# Patient Record
Sex: MALE | Race: WHITE | NOT HISPANIC OR LATINO | Employment: OTHER | URBAN - METROPOLITAN AREA
[De-identification: names, ages, dates, MRNs, and addresses within clinical notes are randomized per-mention and may not be internally consistent; named-entity substitution may affect disease eponyms.]

---

## 2023-01-23 ENCOUNTER — HOSPITAL ENCOUNTER (INPATIENT)
Facility: CLINIC | Age: 83
LOS: 2 days | Discharge: HOME | DRG: 552 | End: 2023-01-25
Attending: EMERGENCY MEDICINE | Admitting: STUDENT IN AN ORGANIZED HEALTH CARE EDUCATION/TRAINING PROGRAM
Payer: COMMERCIAL

## 2023-01-23 ENCOUNTER — OFFICE VISIT (OUTPATIENT)
Dept: URGENT CARE | Facility: URGENT CARE | Age: 83
End: 2023-01-23
Payer: COMMERCIAL

## 2023-01-23 ENCOUNTER — APPOINTMENT (OUTPATIENT)
Dept: CT IMAGING | Facility: CLINIC | Age: 83
DRG: 552 | End: 2023-01-23
Attending: EMERGENCY MEDICINE
Payer: COMMERCIAL

## 2023-01-23 VITALS
WEIGHT: 158 LBS | DIASTOLIC BLOOD PRESSURE: 82 MMHG | TEMPERATURE: 97.2 F | OXYGEN SATURATION: 99 % | HEIGHT: 68 IN | BODY MASS INDEX: 23.95 KG/M2 | HEART RATE: 102 BPM | SYSTOLIC BLOOD PRESSURE: 128 MMHG

## 2023-01-23 DIAGNOSIS — R11.10 VOMITING, UNSPECIFIED VOMITING TYPE, UNSPECIFIED WHETHER NAUSEA PRESENT: ICD-10-CM

## 2023-01-23 DIAGNOSIS — S12.101A CLOSED NONDISPLACED FRACTURE OF SECOND CERVICAL VERTEBRA, UNSPECIFIED FRACTURE MORPHOLOGY, INITIAL ENCOUNTER (H): ICD-10-CM

## 2023-01-23 DIAGNOSIS — M54.2 NECK PAIN: Primary | ICD-10-CM

## 2023-01-23 DIAGNOSIS — S12.001A CLOSED NONDISPLACED FRACTURE OF FIRST CERVICAL VERTEBRA, UNSPECIFIED FRACTURE MORPHOLOGY, INITIAL ENCOUNTER (H): ICD-10-CM

## 2023-01-23 LAB
ABO/RH(D): NORMAL
ALBUMIN SERPL BCG-MCNC: 4.6 G/DL (ref 3.5–5.2)
ALP SERPL-CCNC: 51 U/L (ref 40–129)
ALT SERPL W P-5'-P-CCNC: 28 U/L (ref 10–50)
ANION GAP SERPL CALCULATED.3IONS-SCNC: 15 MMOL/L (ref 7–15)
ANTIBODY SCREEN: NEGATIVE
AST SERPL W P-5'-P-CCNC: 31 U/L (ref 10–50)
BASOPHILS # BLD AUTO: 0 10E3/UL (ref 0–0.2)
BASOPHILS NFR BLD AUTO: 0 %
BILIRUB SERPL-MCNC: 0.4 MG/DL
BUN SERPL-MCNC: 29.9 MG/DL (ref 8–23)
CALCIUM SERPL-MCNC: 9.1 MG/DL (ref 8.8–10.2)
CHLORIDE SERPL-SCNC: 102 MMOL/L (ref 98–107)
CREAT SERPL-MCNC: 0.86 MG/DL (ref 0.67–1.17)
DEPRECATED HCO3 PLAS-SCNC: 27 MMOL/L (ref 22–29)
EOSINOPHIL # BLD AUTO: 0 10E3/UL (ref 0–0.7)
EOSINOPHIL NFR BLD AUTO: 0 %
ERYTHROCYTE [DISTWIDTH] IN BLOOD BY AUTOMATED COUNT: 14.4 % (ref 10–15)
GFR SERPL CREATININE-BSD FRML MDRD: 87 ML/MIN/1.73M2
GLUCOSE SERPL-MCNC: 119 MG/DL (ref 70–99)
HCT VFR BLD AUTO: 44 % (ref 40–53)
HGB BLD-MCNC: 14.3 G/DL (ref 13.3–17.7)
IMM GRANULOCYTES # BLD: 0 10E3/UL
IMM GRANULOCYTES NFR BLD: 0 %
LYMPHOCYTES # BLD AUTO: 0.5 10E3/UL (ref 0.8–5.3)
LYMPHOCYTES NFR BLD AUTO: 6 %
MCH RBC QN AUTO: 27 PG (ref 26.5–33)
MCHC RBC AUTO-ENTMCNC: 32.5 G/DL (ref 31.5–36.5)
MCV RBC AUTO: 83 FL (ref 78–100)
MONOCYTES # BLD AUTO: 0.6 10E3/UL (ref 0–1.3)
MONOCYTES NFR BLD AUTO: 7 %
NEUTROPHILS # BLD AUTO: 7.3 10E3/UL (ref 1.6–8.3)
NEUTROPHILS NFR BLD AUTO: 87 %
NRBC # BLD AUTO: 0 10E3/UL
NRBC BLD AUTO-RTO: 0 /100
PLATELET # BLD AUTO: 266 10E3/UL (ref 150–450)
POTASSIUM SERPL-SCNC: 3.7 MMOL/L (ref 3.4–5.3)
PROT SERPL-MCNC: 7.4 G/DL (ref 6.4–8.3)
RBC # BLD AUTO: 5.29 10E6/UL (ref 4.4–5.9)
SARS-COV-2 RNA RESP QL NAA+PROBE: NEGATIVE
SODIUM SERPL-SCNC: 144 MMOL/L (ref 136–145)
SPECIMEN EXPIRATION DATE: NORMAL
WBC # BLD AUTO: 8.5 10E3/UL (ref 4–11)

## 2023-01-23 PROCEDURE — 120N000001 HC R&B MED SURG/OB

## 2023-01-23 PROCEDURE — 80053 COMPREHEN METABOLIC PANEL: CPT | Performed by: EMERGENCY MEDICINE

## 2023-01-23 PROCEDURE — 99222 1ST HOSP IP/OBS MODERATE 55: CPT | Mod: AI | Performed by: STUDENT IN AN ORGANIZED HEALTH CARE EDUCATION/TRAINING PROGRAM

## 2023-01-23 PROCEDURE — 250N000011 HC RX IP 250 OP 636: Performed by: EMERGENCY MEDICINE

## 2023-01-23 PROCEDURE — 99285 EMERGENCY DEPT VISIT HI MDM: CPT | Mod: 25

## 2023-01-23 PROCEDURE — U0005 INFEC AGEN DETEC AMPLI PROBE: HCPCS | Performed by: EMERGENCY MEDICINE

## 2023-01-23 PROCEDURE — 250N000013 HC RX MED GY IP 250 OP 250 PS 637: Performed by: STUDENT IN AN ORGANIZED HEALTH CARE EDUCATION/TRAINING PROGRAM

## 2023-01-23 PROCEDURE — 36415 COLL VENOUS BLD VENIPUNCTURE: CPT | Performed by: EMERGENCY MEDICINE

## 2023-01-23 PROCEDURE — 99207 PR INPT ADMISSION FROM CLINIC: CPT | Mod: 25 | Performed by: PHYSICIAN ASSISTANT

## 2023-01-23 PROCEDURE — C9803 HOPD COVID-19 SPEC COLLECT: HCPCS

## 2023-01-23 PROCEDURE — 72125 CT NECK SPINE W/O DYE: CPT

## 2023-01-23 PROCEDURE — 85025 COMPLETE CBC W/AUTO DIFF WBC: CPT | Performed by: EMERGENCY MEDICINE

## 2023-01-23 PROCEDURE — 86901 BLOOD TYPING SEROLOGIC RH(D): CPT | Performed by: EMERGENCY MEDICINE

## 2023-01-23 RX ORDER — SIMVASTATIN 40 MG
40 TABLET ORAL AT BEDTIME
COMMUNITY
Start: 2023-01-09

## 2023-01-23 RX ORDER — MULTIVITAMIN,THERAPEUTIC
1 TABLET ORAL DAILY
COMMUNITY

## 2023-01-23 RX ORDER — ACETAMINOPHEN 325 MG/1
650 TABLET ORAL EVERY 6 HOURS PRN
Status: DISCONTINUED | OUTPATIENT
Start: 2023-01-23 | End: 2023-01-25 | Stop reason: HOSPADM

## 2023-01-23 RX ORDER — AMOXICILLIN 250 MG
1 CAPSULE ORAL 2 TIMES DAILY PRN
Status: DISCONTINUED | OUTPATIENT
Start: 2023-01-23 | End: 2023-01-25 | Stop reason: HOSPADM

## 2023-01-23 RX ORDER — ONDANSETRON 4 MG/1
4 TABLET, ORALLY DISINTEGRATING ORAL EVERY 6 HOURS PRN
Status: DISCONTINUED | OUTPATIENT
Start: 2023-01-23 | End: 2023-01-25 | Stop reason: HOSPADM

## 2023-01-23 RX ORDER — PANTOPRAZOLE SODIUM 40 MG/1
40 TABLET, DELAYED RELEASE ORAL DAILY
Status: DISCONTINUED | OUTPATIENT
Start: 2023-01-24 | End: 2023-01-23

## 2023-01-23 RX ORDER — LIDOCAINE 40 MG/G
CREAM TOPICAL
Status: DISCONTINUED | OUTPATIENT
Start: 2023-01-23 | End: 2023-01-25 | Stop reason: HOSPADM

## 2023-01-23 RX ORDER — PROCHLORPERAZINE 25 MG
12.5 SUPPOSITORY, RECTAL RECTAL EVERY 12 HOURS PRN
Status: DISCONTINUED | OUTPATIENT
Start: 2023-01-23 | End: 2023-01-25 | Stop reason: HOSPADM

## 2023-01-23 RX ORDER — ACETAMINOPHEN 650 MG/1
650 SUPPOSITORY RECTAL EVERY 6 HOURS PRN
Status: DISCONTINUED | OUTPATIENT
Start: 2023-01-23 | End: 2023-01-25 | Stop reason: HOSPADM

## 2023-01-23 RX ORDER — PANTOPRAZOLE SODIUM 40 MG/1
40 TABLET, DELAYED RELEASE ORAL DAILY
Status: DISCONTINUED | OUTPATIENT
Start: 2023-01-24 | End: 2023-01-25 | Stop reason: HOSPADM

## 2023-01-23 RX ORDER — AMOXICILLIN 250 MG
2 CAPSULE ORAL 2 TIMES DAILY PRN
Status: DISCONTINUED | OUTPATIENT
Start: 2023-01-23 | End: 2023-01-25 | Stop reason: HOSPADM

## 2023-01-23 RX ORDER — MORPHINE SULFATE 4 MG/ML
4 INJECTION, SOLUTION INTRAMUSCULAR; INTRAVENOUS ONCE
Status: COMPLETED | OUTPATIENT
Start: 2023-01-23 | End: 2023-01-23

## 2023-01-23 RX ORDER — ONDANSETRON 2 MG/ML
4 INJECTION INTRAMUSCULAR; INTRAVENOUS EVERY 6 HOURS PRN
Status: DISCONTINUED | OUTPATIENT
Start: 2023-01-23 | End: 2023-01-25 | Stop reason: HOSPADM

## 2023-01-23 RX ORDER — PROCHLORPERAZINE MALEATE 5 MG
5 TABLET ORAL EVERY 6 HOURS PRN
Status: DISCONTINUED | OUTPATIENT
Start: 2023-01-23 | End: 2023-01-25 | Stop reason: HOSPADM

## 2023-01-23 RX ORDER — NAPROXEN 500 MG/1
500 TABLET ORAL 2 TIMES DAILY PRN
COMMUNITY
Start: 2023-01-09 | End: 2023-03-07

## 2023-01-23 RX ORDER — SIMVASTATIN 40 MG
40 TABLET ORAL AT BEDTIME
Status: DISCONTINUED | OUTPATIENT
Start: 2023-01-23 | End: 2023-01-25 | Stop reason: HOSPADM

## 2023-01-23 RX ADMIN — MORPHINE SULFATE 4 MG: 4 INJECTION, SOLUTION INTRAMUSCULAR; INTRAVENOUS at 22:27

## 2023-01-23 RX ADMIN — SIMVASTATIN 40 MG: 40 TABLET, FILM COATED ORAL at 22:25

## 2023-01-23 ASSESSMENT — ACTIVITIES OF DAILY LIVING (ADL)
ADLS_ACUITY_SCORE: 35

## 2023-01-23 ASSESSMENT — ENCOUNTER SYMPTOMS: NECK PAIN: 1

## 2023-01-23 NOTE — PROGRESS NOTES
Patient presents with complaints of hitting his head while kneeling onto an apparatus that was wrapped in a blanket.  States he had immediate pain in the neck.  He also states that he has vomited 3 times today - even water, and is very thirsty.    He has over 5 second tenting.  Patient sent to ED for IV fluids and evaluation and treatment.

## 2023-01-23 NOTE — ED TRIAGE NOTES
Fell forward from a kneeling position and struck his head last night. No LOC. Not on thinners. Today vomited x3. Has neck pain. Denies HA. Went to  and they though he is dehydrated, as well needs a CT scan. Marked decreased ROM in neck, cannot rotate, pain at base of skull     Triage Assessment       Row Name 01/23/23 0711       Triage Assessment (Adult)    Airway WDL WDL       Respiratory WDL    Respiratory WDL WDL       Skin Circulation/Temperature WDL    Skin Circulation/Temperature WDL WDL       Cardiac WDL    Cardiac WDL WDL       Peripheral/Neurovascular WDL    Peripheral Neurovascular WDL WDL       Cognitive/Neuro/Behavioral WDL    Cognitive/Neuro/Behavioral WDL WDL

## 2023-01-24 ENCOUNTER — APPOINTMENT (OUTPATIENT)
Dept: GENERAL RADIOLOGY | Facility: CLINIC | Age: 83
DRG: 552 | End: 2023-01-24
Attending: PHYSICIAN ASSISTANT
Payer: COMMERCIAL

## 2023-01-24 PROCEDURE — 120N000001 HC R&B MED SURG/OB

## 2023-01-24 PROCEDURE — 250N000013 HC RX MED GY IP 250 OP 250 PS 637: Performed by: EMERGENCY MEDICINE

## 2023-01-24 PROCEDURE — 99232 SBSQ HOSP IP/OBS MODERATE 35: CPT | Performed by: HOSPITALIST

## 2023-01-24 PROCEDURE — 250N000013 HC RX MED GY IP 250 OP 250 PS 637: Performed by: STUDENT IN AN ORGANIZED HEALTH CARE EDUCATION/TRAINING PROGRAM

## 2023-01-24 PROCEDURE — 250N000013 HC RX MED GY IP 250 OP 250 PS 637: Performed by: HOSPITALIST

## 2023-01-24 PROCEDURE — L0172 CERV COL SR FOAM 2PC PRE OTS: HCPCS

## 2023-01-24 PROCEDURE — 99222 1ST HOSP IP/OBS MODERATE 55: CPT | Performed by: PHYSICIAN ASSISTANT

## 2023-01-24 PROCEDURE — 72040 X-RAY EXAM NECK SPINE 2-3 VW: CPT

## 2023-01-24 PROCEDURE — L1499 SPINAL ORTHOSIS NOS: HCPCS

## 2023-01-24 RX ORDER — NALOXONE HYDROCHLORIDE 0.4 MG/ML
0.4 INJECTION, SOLUTION INTRAMUSCULAR; INTRAVENOUS; SUBCUTANEOUS
Status: DISCONTINUED | OUTPATIENT
Start: 2023-01-24 | End: 2023-01-25 | Stop reason: HOSPADM

## 2023-01-24 RX ORDER — CYCLOBENZAPRINE HCL 5 MG
5-10 TABLET ORAL EVERY 8 HOURS PRN
Status: DISCONTINUED | OUTPATIENT
Start: 2023-01-24 | End: 2023-01-25 | Stop reason: HOSPADM

## 2023-01-24 RX ORDER — OXYCODONE HYDROCHLORIDE 5 MG/1
5-10 TABLET ORAL EVERY 4 HOURS PRN
Status: DISCONTINUED | OUTPATIENT
Start: 2023-01-24 | End: 2023-01-25 | Stop reason: HOSPADM

## 2023-01-24 RX ORDER — NALOXONE HYDROCHLORIDE 0.4 MG/ML
0.2 INJECTION, SOLUTION INTRAMUSCULAR; INTRAVENOUS; SUBCUTANEOUS
Status: DISCONTINUED | OUTPATIENT
Start: 2023-01-24 | End: 2023-01-25 | Stop reason: HOSPADM

## 2023-01-24 RX ORDER — ACETAMINOPHEN 500 MG
1000 TABLET ORAL EVERY 8 HOURS
Status: DISCONTINUED | OUTPATIENT
Start: 2023-01-24 | End: 2023-01-25 | Stop reason: HOSPADM

## 2023-01-24 RX ADMIN — ACETAMINOPHEN 1000 MG: 500 TABLET ORAL at 18:03

## 2023-01-24 RX ADMIN — ACETAMINOPHEN 650 MG: 325 TABLET, FILM COATED ORAL at 10:46

## 2023-01-24 RX ADMIN — ACETAMINOPHEN 650 MG: 325 TABLET, FILM COATED ORAL at 01:58

## 2023-01-24 RX ADMIN — OXYCODONE HYDROCHLORIDE 10 MG: 5 TABLET ORAL at 18:03

## 2023-01-24 RX ADMIN — OXYCODONE HYDROCHLORIDE 5 MG: 5 TABLET ORAL at 08:57

## 2023-01-24 RX ADMIN — PANTOPRAZOLE SODIUM 40 MG: 40 TABLET, DELAYED RELEASE ORAL at 08:57

## 2023-01-24 RX ADMIN — OXYCODONE HYDROCHLORIDE 5 MG: 5 TABLET ORAL at 01:58

## 2023-01-24 RX ADMIN — OXYCODONE HYDROCHLORIDE 10 MG: 5 TABLET ORAL at 23:08

## 2023-01-24 RX ADMIN — OXYCODONE HYDROCHLORIDE 10 MG: 5 TABLET ORAL at 13:11

## 2023-01-24 RX ADMIN — SIMVASTATIN 40 MG: 40 TABLET, FILM COATED ORAL at 21:12

## 2023-01-24 ASSESSMENT — ACTIVITIES OF DAILY LIVING (ADL)
ADLS_ACUITY_SCORE: 35
ADLS_ACUITY_SCORE: 40
ADLS_ACUITY_SCORE: 36
ADLS_ACUITY_SCORE: 35

## 2023-01-24 NOTE — PROGRESS NOTES
Status [id number]    Pt is AX4 and is call light appropriate. Pt is up with SBA to bathroom. C-collar on at all times, pt verbalized that he has chronic numbness in feet but denies having any new numbness or tingling in body, pulses present. Pt given prn medication for pain, please see MAR for further details. NPO midnight. Fall risk precautions in place, call light within reach. Will continue to monitor and treat as needed.

## 2023-01-24 NOTE — PROGRESS NOTES
[facility]    Hospitalist Progress Note    Date of Admission:  1/23/2023    Assessment & Plan   Jaden Zimmer is a 82 year old male with past medical history significant for HLD admitted on 1/23/2023 with neck pain.      Acute type II odontoid fracture   Acute nondisplaced bilateral lateral fractures of the C1 arch   Mechanical fall   The patient presents to the ED with neck pain. He states that he was on his knees putting something away in the closet the previous night and lost his balance when he leaned forward and fell and hit his head on the wall.  On day of admission, he has been having significant neck pain and is unable to turn his head.   * CT showed Acute type II odontoid fracture with fracture fragment alignment maintained. Acute nondisplaced bilateral lateral fractures of the C1 arch. There is mild prevertebral soft tissue edema at the C1-2 level but predental space is maintained. No significant canal compromise or neural foraminal narrowing throughout cervical spine.  - Neurosurgery consulted.  No surgical plans.  Recommended Aspen cervical collar but should be worn at all times, okay to remove briefly for hygiene, shower collar will be provided for bathing.  -Upright x-rays AP and lateral to be obtained after collar placed.  -Will need to follow-up with neurosurgery clinic in 6 weeks with x-rays AP and lateral and at 12 weeks with repeat cervical CT scan without contrast.  - Pain control as needed: Flexeril, Tylenol, oxycodone  -Activity as tolerated.  Avoid excessive bending and twisting of his neck and do not lift heavy objects.  - PT/OT     Hyperlipidemia  - Resume PTA simvastatin      Diet: Regular   DVT Prophylaxis: Pneumatic Compression Devices  Grimes Catheter: Not present  Lines: None     Cardiac Monitoring: None  Code Status: Full Code               Medical Decision Making               Clinically Significant Risk Factors Present on Admission                                  Jessica Navarro MD  Text Page (7am - 6pm, M-F)  [facility]  Securely message with the [employer] Web Console (learn more here)  Text page via [employer] Paging/Directory      Interval History   Stable overnight.  Patient notes that he is not doing good, mainly because of significant pain.  The cough is severely limiting his activity.  He is worried about how he will take care of his wife at home.  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    -Data reviewed today: I reviewed all new labs and imaging results over the last 24 hours. I personally reviewed CT.  Images resulted as above.    Physical Exam   Temp: 97.5  F (36.4  C) Temp src: Oral BP: 120/74 Pulse: 70   Resp: 16 SpO2: 97 % O2 Device: None (Room air)    Vitals:    01/23/23 1706   Weight: 71.7 kg (158 lb)     Vital Signs with Ranges  Temp:  [97.5  F (36.4  C)-98.2  F (36.8  C)] 97.5  F (36.4  C)  Pulse:  [70-94] 70  Resp:  [16] 16  BP: (120-139)/(65-75) 120/74  SpO2:  [94 %-100 %] 97 %  I/O last 3 completed shifts:  In: 500 [P.O.:500]  Out: -     Constitutional: Alert, appears comfortable, in no acute distress, wearing Aspen collar  Respiratory: Non labored breathing, clear to auscultation bilaterally, no crackles or wheezes  Cardiovascular: Heart sounds regular rate and rhythm, no murmurs, no leg edema  GI: Abdomen is soft, non distended, non tender. Normal BS  Skin/Integumen: no rashes, no pressure sores  Neuro: alert, converses appropriately, moving all extremities, fluent speech, no facial asymmetry  Psych: mood and affect appropriate      Medications       pantoprazole  40 mg Oral Daily     simvastatin  40 mg Oral At Bedtime     sodium chloride (PF)  3 mL Intracatheter Q8H       Data   Recent Labs   Lab 01/23/23  1851   WBC 8.5   HGB 14.3   MCV 83         POTASSIUM 3.7   CHLORIDE 102   CO2 27   BUN 29.9*   CR 0.86   ANIONGAP 15   NARENDRA 9.1   *   ALBUMIN 4.6   PROTTOTAL 7.4   BILITOTAL  0.4   ALKPHOS 51   ALT 28   AST 31       Imaging  Recent Results (from the past 24 hour(s))   Cervical spine CT w/o contrast    Narrative    EXAM: CT CERVICAL SPINE W/O CONTRAST  LOCATION: [facility]  DATE/TIME: 1/23/2023 6:14 PM    INDICATION: Neck pain after a fall.  COMPARISON: None.  TECHNIQUE: CT cervical spine without contrast. Dose reduction techniques were performed.    FINDINGS: There is mild prevertebral soft tissue swelling at the level of the C1 and C2 vertebral bodies. The predental space is maintained. There is a nondisplaced acute type II odontoid fracture with no significant fracture displacement or angulation.   There are bilateral nondisplaced fractures of the lateral portions of the C1 arch bilaterally. This is best visualized on axial bone window image #32. 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 No other acute cervical spine fracture is   visualized. There is a slight anterior listhesis of C4 in relation to C5. The vertebral body heights are well-maintained throughout. There is diffuse degenerative disc disease throughout the cervical spine with a mild loss of height, endplate changes   along with small anterior and posterior osteophyte formations. There is probable ankylosis across the C5-6 disc space level. There is diffuse facet arthropathy visualized.    There is no significant canal compromise or significant neural foraminal narrowing throughout the cervical spine. The lung apices are clear. The paraspinal soft tissues are unremarkable.      Impression    IMPRESSION:  1. Acute type II odontoid fracture with fracture fragment alignment maintained.  2. Acute nondisplaced bilateral lateral fractures of the C1 arch.  3. Mild prevertebral soft tissue edema at the C1-2 level but predental space is maintained.  4.  No significant canal compromise or neural foraminal narrowing throughout cervical spine.    These findings were communicated by phone to   Joing at 6:35 pm on 1/23/2023.   XR Cervical Spine 2/3 Views    Narrative    CERVICAL SPINE TWO TO THREE VIEWS  1/24/2023 1:58 PM    INDICATION: C2 and C1 fracture.    COMPARISON: Cervical spine CT 1/23/2023.      Impression    IMPRESSION: Fractures through the base of the odontoid process and  posterior C1 ring are visible radiographically, grossly unchanged from  the prior CT allowing for differences in modality. Alignment within  normal limits, unchanged. Mild degenerative changes.    ZOE NANCE MD         SYSTEM ID:  [id number]

## 2023-01-24 NOTE — PHARMACY-ADMISSION MEDICATION HISTORY
Pharmacy Medication History  Admission medication history interview status for the 1/23/2023  admission is complete. See EPIC admission navigator for prior to admission medications     Location of Interview: Patient room  Medication history sources: Patient, Surescripts and Care Everywhere    Significant changes made to the medication list:  1. Added multivitamin  2. Added frequency to all other meds    In the past week, patient estimated taking medication this percent of the time: greater than 90%    Medication Affordability:  Not including over the counter (OTC) medications, was there a time in the past 12 months when you did not take your medications as prescribed because of cost?: No    Additional medication history information:   1. Patient reported he has been taking 1 tablet of naproxen every morning to prevent pain.    Medication reconciliation completed by provider prior to medication history? No    Time spent in this activity: 15 min    Prior to Admission medications    Medication Sig Last Dose Taking? Auth Provider Long Term End Date   multivitamin, therapeutic (THERA-VIT) TABS tablet Take 1 tablet by mouth daily 1/23/2023 at AM Yes Unknown, Entered By History     naproxen (NAPROSYN) 500 MG tablet Take 500 mg by mouth 2 times daily as needed (pain) 1/23/2023 at AM Yes Reported, Patient     omeprazole (PRILOSEC) 20 MG DR capsule Take 20 mg by mouth daily 1/23/2023 at AM Yes Reported, Patient     simvastatin (ZOCOR) 40 MG tablet Take 40 mg by mouth At Bedtime 1/22/2023 at HS Yes Reported, Patient Yes        The information provided in this note is only as accurate as the sources available at the time of update(s)

## 2023-01-24 NOTE — ED NOTES
"[facility]  ED Nurse Handoff Report    ED Chief complaint: Fall, Neck Pain, and Head Injury      ED Diagnosis:   Final diagnoses:   None       Code Status: hospitalist to determine    Allergies: No Known Allergies    Patient Story:   Pt was cleaning in a closet yesterday.  Was on his knees and lost his balance falling  Forward.     Focused Assessment:    Pt Alert and orient x4. Does c/o neck pain. Neck Brace intact.     Labs Ordered and Resulted from Time of ED Arrival to Time of ED Departure - No data to display     Cervical spine CT w/o contrast   Final Result   IMPRESSION:   1. Acute type II odontoid fracture with fracture fragment alignment maintained.   2. Acute nondisplaced bilateral lateral fractures of the C1 arch.   3. Mild prevertebral soft tissue edema at the C1-2 level but predental space is maintained.   4.  No significant canal compromise or neural foraminal narrowing throughout cervical spine.      These findings were communicated by phone to Dr. Lama at 6:35 pm on 1/23/2023.            Treatments and/or interventions provided:      Medications - No data to display     Patient's response to treatments and/or interventions:   Resting comfortably    To be done/followed up on inpatient unit:    See any in-patient orders    Does this patient have any cognitive concerns?: none    Activity level - Baseline/Home:    Independent    Activity Level - Current:     Stand with Assist    Patient's Preferred language: English     Needed?: No    Isolation: None  Infection: Not Applicable  Patient tested for COVID 19 prior to admission: NO    Bariatric?: No    Vital Signs:   Vitals:    01/23/23 1706   BP: 125/65   Pulse: 94   Resp: 16   Temp: 98.1  F (36.7  C)   TempSrc: Temporal   SpO2: 100%   Weight: 71.7 kg (158 lb)   Height: 1.727 m (5' 8\")       Cardiac Rhythm:     Was the PSS-3 completed:   Yes  What interventions are required if any?               Family Comments: Wife at home. "   OBS brochure/video discussed/provided to patient/family: N/A              Name of person given brochure if not patient:              Relationship to patient:    For the majority of the shift this patient's behavior was Green.   Behavioral interventions performed were     ED NURSE PHONE NUMBER: *[phone]

## 2023-01-24 NOTE — PROGRESS NOTES
RECEIVING UNIT ED HANDOFF REVIEW    ED Nurse Handoff Report was reviewed by: Robin Sainz RN on January 24, 2023 at 1:20 AM

## 2023-01-24 NOTE — PROGRESS NOTES
S: Patient was seen today at 2412 for fitting of Aspen cervical collar and Marcia collar as ordered.    O:  Pt 1st fit with a Marcia collar.  Posterior Marcia collar was donned.  Anterior Marcia collar was donned and locked into slot # 2.  The Marcia collar fits adequate.   I removed the Marcia collar.     Pt was fit with a [place] cervical collar. The anterior section was donned. The posterior section was donned and fastened to the front using the velcro straps. Attention was given to the chin support being sure that the correct height was set to support the chin. An extra padding set was also provided. Patient instructed on donning, doffing, use and care.  Pt is not happy about either collars at this time stating he does not know how he is going to be able to deal with wearing these.      A: An [place] collar and Marcia collar for bathing was provided and fit as required.     P: Patient will wear the Aspen collar at all times following Physician guidelines except for when bathing.  When bathing the patient will wear the Marcia collar. Patient has been instructed to contact our facility with any questions and/or concerns    G: The objective/goal of the collar is to help reduce motion/give cervical stability.  Ke TOLEDO

## 2023-01-24 NOTE — PLAN OF CARE
Summary: C1. C2 fracture   Date & Time: 1/24/23 6254-8154  Orientation: A&x4  Activity Level: SBA  Fall Risk: Yes  Behavior & Aggression: Green  Pain Management: Decreased with oxy & tylenol    ABNL VS/O2: VSS on RA  ABNL Lab/BG: WNL  Diet: Regular  Bowel/Bladder: Continent   Drains/Devices: Aspen C-collar in place (on all times, ex can remove for quick hygiene, taken off for showers),   Skin/peripheral: chronic numbness in feet (baseline), pulses present  Tests/Procedures: upright x-ray after aspirin collar  Anticipated  DC Date: Pending  Other Important Info: pt will not need surgery

## 2023-01-24 NOTE — H&P
[facility]    History and Physical - Hospitalist Service       Date of Admission:  1/23/2023    Assessment & Plan      Jaden Zimmer is a 82 year old male with past medical history significant for HLD admitted on 1/23/2023 with neck pain.     Acute type II odontoid fracture   Acute nondisplaced bilateral lateral fractures of the C1 arch   Mechanical fall   The patient presents to the ED with neck pain. He states that he was on his knees putting something away in the closet last night and lost his balance when he leaned forward and fell and hit his head on the wall. Today he has been having significant neck pain and is unable to turn his head.   * CT showed Acute type II odontoid fracture with fracture fragment alignment maintained. Acute nondisplaced bilateral lateral fractures of the C1 arch. There is mild prevertebral soft tissue edema at the C1-2 level but predental space is maintained. No significant canal compromise or neural foraminal narrowing throughout cervical spine.  - Neurosurgery consulted  - C collar at all times   - Pain control as needed   - NPO at midnight in case intervention is needed   - PT/OT when cleared by neurosurgery     Hyperlipidemia  - Resume PTA simvastatin     Diet: Regular   DVT Prophylaxis: Pneumatic Compression Devices  Grimes Catheter: Not present  Lines: None     Cardiac Monitoring: None  Code Status: Full Code       Aleena Crandall MD  Hospitalist Service  [facility]  Securely message with [employer] (more info)  Text page via [employer] Paging/Directory     ______________________________________________________________________    Chief Complaint   Neck pain     History is obtained from the patient    History of Present Illness   Jaden Zimmer is a 82 year old male who presents to the ED with neck pain. He states that he was on his knees putting something away in the closet last night and lost his balance when he leaned  forward and fell and hit his head on the wall. He denies any preceding symptoms such as light-headedness, chest pain, palpitations, shortness of breath etc. He denies LOC. Today he has been having significant neck pain and is unable to turn his head.      Past Medical History    No past medical history on file.    Past Surgical History   No past surgical history on file.    Prior to Admission Medications   Prior to Admission Medications   Prescriptions Last Dose Informant Patient Reported? Taking?   multivitamin, therapeutic (THERA-VIT) TABS tablet 1/23/2023 at AM Self Yes Yes   Sig: Take 1 tablet by mouth daily   naproxen (NAPROSYN) 500 MG tablet 1/23/2023 at AM Self Yes Yes   Sig: Take 500 mg by mouth 2 times daily as needed (pain)   omeprazole (PRILOSEC) 20 MG DR capsule 1/23/2023 at AM Self Yes Yes   Sig: Take 20 mg by mouth daily   simvastatin (ZOCOR) 40 MG tablet 1/22/2023 at HS Self Yes Yes   Sig: Take 40 mg by mouth At Bedtime      Facility-Administered Medications: None        Review of Systems    The 10 point Review of Systems is negative other than noted in the HPI or here.   Physical Exam   Vital Signs: Temp: 98.1  F (36.7  C) Temp src: Temporal BP: 125/65 Pulse: 94   Resp: 16 SpO2: 100 % O2 Device: None (Room air)    Weight: 158 lbs 0 oz    Constitutional: Awake, alert, cooperative, no apparent distress.  Eyes: Conjunctiva and pupils examined and normal.  HEENT: Moist mucous membranes, normal dentition.  Respiratory: Clear to auscultation bilaterally, no crackles or wheezing.  Cardiovascular: Regular rate and rhythm, normal S1 and S2, and no murmur noted.  GI: Soft, non-distended, non-tender, normal bowel sounds.  Lymph/Hematologic: No anterior cervical or supraclavicular adenopathy.  Skin: No rashes, no cyanosis, no edema.  Musculoskeletal: No joint swelling, erythema or tenderness. In C collar   Neurologic: Cranial nerves 2-12 intact, normal strength and sensation.  Psychiatric: Alert, oriented to  person, place and time, no obvious anxiety or depression.      Medical Decision Making       60 MINUTES SPENT BY ME on the date of service doing chart review, history, exam, documentation & further activities per the note.      Data     I have personally reviewed the following data over the past 24 hrs:    8.5  \   14.3   / 266     144 102 29.9 (H) /  119 (H)   3.7 27 0.86 \       ALT: 28 AST: 31 AP: 51 TBILI: 0.4   ALB: 4.6 TOT PROTEIN: 7.4 LIPASE: N/A       Imaging results reviewed over the past 24 hrs:   Recent Results (from the past 24 hour(s))   Cervical spine CT w/o contrast    Narrative    EXAM: CT CERVICAL SPINE W/O CONTRAST  LOCATION: [facility]  DATE/TIME: 1/23/2023 6:14 PM    INDICATION: Neck pain after a fall.  COMPARISON: None.  TECHNIQUE: CT cervical spine without contrast. Dose reduction techniques were performed.    FINDINGS: There is mild prevertebral soft tissue swelling at the level of the C1 and C2 vertebral bodies. The predental space is maintained. There is a nondisplaced acute type II odontoid fracture with no significant fracture displacement or angulation.   There are bilateral nondisplaced fractures of the lateral portions of the C1 arch bilaterally. This is best visualized on axial bone window image #32. 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 No other acute cervical spine fracture is   visualized. There is a slight anterior listhesis of C4 in relation to C5. The vertebral body heights are well-maintained throughout. There is diffuse degenerative disc disease throughout the cervical spine with a mild loss of height, endplate changes   along with small anterior and posterior osteophyte formations. There is probable ankylosis across the C5-6 disc space level. There is diffuse facet arthropathy visualized.    There is no significant canal compromise or significant neural foraminal narrowing throughout the cervical spine. The lung apices are clear.  The paraspinal soft tissues are unremarkable.      Impression    IMPRESSION:  1. Acute type II odontoid fracture with fracture fragment alignment maintained.  2. Acute nondisplaced bilateral lateral fractures of the C1 arch.  3. Mild prevertebral soft tissue edema at the C1-2 level but predental space is maintained.  4.  No significant canal compromise or neural foraminal narrowing throughout cervical spine.    These findings were communicated by phone to Dr. Lama at 6:35 pm on 1/23/2023.

## 2023-01-24 NOTE — ED PROVIDER NOTES
"  History     Chief Complaint:  Fall, Neck Pain, and Head Injury       HPI   Jaden Zimmer is a 82 year old male with a history of hyperlipidemia who presents with neck pain. The patient was putting something away in his closet last night, and was on his hands and knees. He leaned forward, lost balance, and fell, hitting his forehead on the wall. He is having neck pain today and is unable to turn his head.     Review of External Notes:   Reviewed outside facility urgent care note:  1/23/2023  [facility]   Berkley Mccarty  Internal Medicine  Neck pain +1 more  Dx  Urgent Care    Vomiting   Reason for Visit     Progress Notes  Berkley Mccarty (Physician Assistant - C)     Internal Medicine  Patient presents with complaints of hitting his head while kneeling onto an apparatus that was wrapped in a blanket.  States he had immediate pain in the neck.  He also states that he has vomited 3 times today - even water, and is very thirsty.     He has over 5 second tenting.  Patient sent to ED for IV fluids and evaluation and treatment.              ROS:  Review of Systems   Musculoskeletal: Positive for neck pain.   All other systems reviewed and are negative.      Allergies:  No known drug allergies     Medications:    Omeprazole  Simvastatin  Naproxen    Past Medical History:    Hyperlipidemia  Adenoma of the large intestine  Sensorineural hearing loss    Past Surgical History:    Tonsillectomy  Bilateral cataract removal    Family History:    Father- MI  Mother- breast cancer, cataracts    Social History:  The patient presents via private vehicle    Physical Exam     Patient Vitals for the past 24 hrs:   BP Temp Temp src Pulse Resp SpO2 Height Weight   01/23/23 1706 125/65 98.1  F (36.7  C) Temporal 94 16 100 % 1.727 m (5' 8\") 71.7 kg (158 lb)        Physical Exam  Exam:   General: Alert, No distress. Nontoxic appearance. Sitting up.   Head: No signs of trauma. "   Mouth/Throat: Oropharynx moist.   Eyes: Conjunctivae are normal. Pupils are equal..   Neck: Difficult to rotating head at all. Pain to posterior upper cervical spine.   CV: Appears well perfused.  Resp:No respiratory distress.   MSK: Normal range of motion. No obvious deformity.   Neuro: The patient is alert and interactive. SANTOS. Speech normal. GCS 15  Skin: No lesion or sign of trauma noted.   Psych: normal mood and affect. behavior is normal.     Emergency Department Course     Imaging:  Cervical spine CT w/o contrast   Final Result   IMPRESSION:   1. Acute type II odontoid fracture with fracture fragment alignment maintained.   2. Acute nondisplaced bilateral lateral fractures of the C1 arch.   3. Mild prevertebral soft tissue edema at the C1-2 level but predental space is maintained.   4.  No significant canal compromise or neural foraminal narrowing throughout cervical spine.      These findings were communicated by phone to Dr. Lama at 6:35 pm on 1/23/2023.         Report per radiology    Laboratory:  Labs Ordered and Resulted from Time of ED Arrival to Time of ED Departure   CBC WITH PLATELETS AND DIFFERENTIAL - Abnormal       Result Value    WBC Count 8.5      RBC Count 5.29      Hemoglobin 14.3      Hematocrit 44.0      MCV 83      MCH 27.0      MCHC 32.5      RDW 14.4      Platelet Count 266      % Neutrophils 87      % Lymphocytes 6      % Monocytes 7      % Eosinophils 0      % Basophils 0      % Immature Granulocytes 0      NRBCs per 100 WBC 0      Absolute Neutrophils 7.3      Absolute Lymphocytes 0.5 (*)     Absolute Monocytes 0.6      Absolute Eosinophils 0.0      Absolute Basophils 0.0      Absolute Immature Granulocytes 0.0      Absolute NRBCs 0.0     COMPREHENSIVE METABOLIC PANEL        Emergency Department Course & Assessments:     Interventions:  Medications - No data to display     Independent Interpretation (X-rays, CTs, rhythm strip):  CT scan shows evidence of cervical spine  fracture    Consultations/Discussion of Management or Tests:  1832 I spoke with radiology regarding the patient's imaging results. There is a type II odontoid fracture and bilateral lateral fractures of the C1 arch. Neurosurgery paged.    1840  I spoke with bryon Almaraz PA-C working with Dr. Cruz from neurosurgery.  1919 I spoke with Dr. Crandall from hospitalist service regarding the patient's presentation and workup.     Assessments:  1710 I obtained history and examined the patient as noted above.  1715 Patient placed in C-collar.   1835 I rechecked the patient and discussed his imaging results. Will plan for admission.     Disposition:  The patient was admitted to the hospital under the care of Dr. Crandall.     Impression & Plan      Medical Decision Making:  This patient is presenting to the ER after a minor fall that resulted in a cervical spine fracture.  He was seen in the triage area initially and immediately placed in a c-collar.  He is neurologically intact.  With normal strength and sensation in his upper and lower extremities.  Neurosurgery was consulted and will give recommendations for further treatment.  I spoke with Dr. Crandall to admit the patient to the hospitalist service.    Diagnosis:    ICD-10-CM    1. Closed nondisplaced fracture of first cervical vertebra, unspecified fracture morphology, initial encounter (H)  S12.001A              Scribe Disclosure:  I, Myriam Ortiz, am serving as a scribe at 6:41 PM on 1/23/2023 to document services personally performed by Delroy Lama MD based on my observations and the provider's statements to me.     1/23/2023   Delroy Lama MD Joing, Todd Roger, MD  01/24/23 1507

## 2023-01-24 NOTE — CONSULTS
Neurosurgery Consult    HPI    Mr. Zimmer is an 82-year-old male, who fell forward hitting his forehead yesterday, he presented to the emergency department with neck pain, he was found to have acute type II odontoid fracture, and acute nondisplaced bilateral lateral fractures of the C1 arch.  He reports neck pain.  He denies any other symptoms.  He denies numbness or tingling in his upper or lower extremities.  Denies any weakness in his upper or lower extremities.  He was placed in a cervical collar in the ER and admitted for observation.    Medical history  Hyperlipidemia    Social history  Retired      B/P: 120/74, T: 97.5, P: 70, R: 16       Exam    Alert and oriented no acute distress  Bilateral upper extremities with 5/5 strength  Trudi sign negative  Reflexes absent triceps, absent biceps, 1+ brachioradialis    Bilateral lower extremities with 5/5 strength  Reflexes absent patella/ankle  Negative ankle clonus negative Babinski bilaterally    Cervical spine mildly tender to palpation in the midline and at the base of the skull.    Imaging    Cervical CT scan demonstrated    IMPRESSION:  1. Acute type II odontoid fracture with fracture fragment alignment maintained.  2. Acute nondisplaced bilateral lateral fractures of the C1 arch.  3. Mild prevertebral soft tissue edema at the C1-2 level but predental space is maintained.  4.  No significant canal compromise or neural foraminal narrowing throughout cervical spine.    Assessment    Status post fall, neck pain, acute type II odontoid fracture, acute nondisplaced bilateral lateral fractures of the C1 arch, no canal compromise, no radicular or myelopathic symptoms.    Plan:      Recommend the patient obtain a Aspen cervical collar, consult orthotics has been placed, he should wear the collar at all times, okay to remove briefly for hygiene, shower collar will be provided for bathing.    Once his aspirin collar has been placed he should have upright x-rays AP  and lateral.    We will plan to follow-up with him in 6 weeks with x-rays AP and lateral, and at 12 weeks with a repeat cervical CT scan without contrast.    He may have a regular diet and activity as tolerated.  He should avoid excessive bending and twisting of his neck, and not lift any heavy objects.    Discussed with Dr. Cruz

## 2023-01-24 NOTE — PROVIDER NOTIFICATION
MD Notification    Notified Person: MD    Notified Person Name: Radha Lancaster    Notification Date/Time: 1/24/23    Notification Interaction: [employer] web paged    Purpose of Notification: activity orders and NPO status    Orders Received:    Comments:

## 2023-01-25 ENCOUNTER — APPOINTMENT (OUTPATIENT)
Dept: PHYSICAL THERAPY | Facility: CLINIC | Age: 83
DRG: 552 | End: 2023-01-25
Attending: HOSPITALIST
Payer: COMMERCIAL

## 2023-01-25 ENCOUNTER — APPOINTMENT (OUTPATIENT)
Dept: OCCUPATIONAL THERAPY | Facility: CLINIC | Age: 83
DRG: 552 | End: 2023-01-25
Attending: HOSPITALIST
Payer: COMMERCIAL

## 2023-01-25 VITALS
SYSTOLIC BLOOD PRESSURE: 135 MMHG | TEMPERATURE: 97.5 F | HEIGHT: 68 IN | BODY MASS INDEX: 23.95 KG/M2 | RESPIRATION RATE: 18 BRPM | OXYGEN SATURATION: 98 % | HEART RATE: 74 BPM | DIASTOLIC BLOOD PRESSURE: 81 MMHG | WEIGHT: 158 LBS

## 2023-01-25 DIAGNOSIS — S12.101A CLOSED NONDISPLACED FRACTURE OF SECOND CERVICAL VERTEBRA, UNSPECIFIED FRACTURE MORPHOLOGY, INITIAL ENCOUNTER (H): Primary | ICD-10-CM

## 2023-01-25 DIAGNOSIS — S12.001A CLOSED NONDISPLACED FRACTURE OF FIRST CERVICAL VERTEBRA, UNSPECIFIED FRACTURE MORPHOLOGY, INITIAL ENCOUNTER (H): ICD-10-CM

## 2023-01-25 PROCEDURE — 250N000013 HC RX MED GY IP 250 OP 250 PS 637: Performed by: EMERGENCY MEDICINE

## 2023-01-25 PROCEDURE — 99239 HOSP IP/OBS DSCHRG MGMT >30: CPT | Performed by: HOSPITALIST

## 2023-01-25 PROCEDURE — 97165 OT EVAL LOW COMPLEX 30 MIN: CPT | Mod: GO

## 2023-01-25 PROCEDURE — 250N000013 HC RX MED GY IP 250 OP 250 PS 637: Performed by: HOSPITALIST

## 2023-01-25 PROCEDURE — 97116 GAIT TRAINING THERAPY: CPT | Mod: GP | Performed by: PHYSICAL THERAPIST

## 2023-01-25 PROCEDURE — 97530 THERAPEUTIC ACTIVITIES: CPT | Mod: GP | Performed by: PHYSICAL THERAPIST

## 2023-01-25 PROCEDURE — 250N000013 HC RX MED GY IP 250 OP 250 PS 637: Performed by: STUDENT IN AN ORGANIZED HEALTH CARE EDUCATION/TRAINING PROGRAM

## 2023-01-25 PROCEDURE — 250N000011 HC RX IP 250 OP 636: Performed by: STUDENT IN AN ORGANIZED HEALTH CARE EDUCATION/TRAINING PROGRAM

## 2023-01-25 PROCEDURE — 97535 SELF CARE MNGMENT TRAINING: CPT | Mod: GO

## 2023-01-25 PROCEDURE — 97161 PT EVAL LOW COMPLEX 20 MIN: CPT | Mod: GP | Performed by: PHYSICAL THERAPIST

## 2023-01-25 RX ORDER — AMOXICILLIN 250 MG
1 CAPSULE ORAL 2 TIMES DAILY
Qty: 30 TABLET | Refills: 0 | Status: SHIPPED | OUTPATIENT
Start: 2023-01-25 | End: 2023-03-07

## 2023-01-25 RX ORDER — OXYCODONE HYDROCHLORIDE 5 MG/1
5-10 TABLET ORAL EVERY 6 HOURS PRN
Qty: 45 TABLET | Refills: 0 | Status: SHIPPED | OUTPATIENT
Start: 2023-01-25 | End: 2023-03-07

## 2023-01-25 RX ORDER — ACETAMINOPHEN 500 MG
TABLET ORAL
Qty: 100 TABLET | Refills: 0 | Status: SHIPPED | OUTPATIENT
Start: 2023-01-25 | End: 2023-03-07

## 2023-01-25 RX ORDER — POLYETHYLENE GLYCOL 3350 17 G/17G
1 POWDER, FOR SOLUTION ORAL DAILY PRN
Qty: 10 PACKET | Refills: 0 | Status: SHIPPED | OUTPATIENT
Start: 2023-01-25 | End: 2023-03-07

## 2023-01-25 RX ORDER — METHOCARBAMOL 500 MG/1
500 TABLET, FILM COATED ORAL 4 TIMES DAILY PRN
Qty: 30 TABLET | Refills: 0 | Status: SHIPPED | OUTPATIENT
Start: 2023-01-25 | End: 2023-03-07

## 2023-01-25 RX ADMIN — OXYCODONE HYDROCHLORIDE 10 MG: 5 TABLET ORAL at 09:13

## 2023-01-25 RX ADMIN — PANTOPRAZOLE SODIUM 40 MG: 40 TABLET, DELAYED RELEASE ORAL at 07:44

## 2023-01-25 RX ADMIN — ACETAMINOPHEN 1000 MG: 500 TABLET ORAL at 01:10

## 2023-01-25 RX ADMIN — ACETAMINOPHEN 650 MG: 325 TABLET, FILM COATED ORAL at 14:36

## 2023-01-25 RX ADMIN — ACETAMINOPHEN 1000 MG: 500 TABLET ORAL at 07:43

## 2023-01-25 RX ADMIN — ONDANSETRON 4 MG: 4 TABLET, ORALLY DISINTEGRATING ORAL at 03:52

## 2023-01-25 RX ADMIN — CYCLOBENZAPRINE 10 MG: 5 TABLET, FILM COATED ORAL at 14:36

## 2023-01-25 RX ADMIN — PROCHLORPERAZINE MALEATE 5 MG: 5 TABLET ORAL at 07:43

## 2023-01-25 RX ADMIN — OXYCODONE HYDROCHLORIDE 10 MG: 5 TABLET ORAL at 03:43

## 2023-01-25 ASSESSMENT — ACTIVITIES OF DAILY LIVING (ADL)
ADLS_ACUITY_SCORE: 40

## 2023-01-25 NOTE — PROGRESS NOTES
[facility]  Neurosurgery Daily Progress Note    Assessment & Plan   82M admitted s/p fall with neck pain and acute type II odontoid fracture and acute nondisplaced bilateral lateral fractures of the C1 arch. Today, patient reports 5/10 neck pain, denies radicular symptoms. Cervical collar in place. Upright xray stable, reviewed with Dr. Cruz.     CERVICAL SPINE TWO TO THREE VIEWS  1/24/2023 1:58 PM  IMPRESSION: Fractures through the base of the odontoid process and  posterior C1 ring are visible radiographically, grossly unchanged from  the prior CT allowing for differences in modality. Alignment within  normal limits, unchanged. Mild degenerative changes.     Plan:  - Continue to wear Aspen collar at all times, okay to remove briefly for hygiene, Marcia collar fitted for showering  - Avoid excessive bending, twisting, and heavy lifting   - Follow up with NSG clinic in 6 weeks with XR and 12 weeks with CT   - NSG will sign off, please call with questions or concerns   - Appreciate assistance from specialties        Discussed with Dr. Anthony Rodriguez, CNP  [facility] [place] [address]  Tel [phone]  Pager [phone]    Interval History   Stable     Physical Exam   Temp: 97.5  F (36.4  C) Temp src: Oral BP: 135/81 Pulse: 74   Resp: 18 SpO2: 98 % O2 Device: None (Room air)    Vitals:    01/23/23 1706   Weight: 71.7 kg (158 lb)     Vital Signs with Ranges  Temp:  [97.5  F (36.4  C)] 97.5  F (36.4  C)  Pulse:  [69-74] 74  Resp:  [16-18] 18  BP: (135-140)/(79-81) 135/81  SpO2:  [97 %-98 %] 98 %  I/O last 3 completed shifts:  In: 800 [P.O.:800]  Out: -     Mental status:  Alert and oriented x 3, speech is fluent.  Cranial nerves:  II-XII intact.   Motor:  Moves all extremities. Strength is 5/5 throughout the upper and lower extremities.   Sensation:  Intact  Cervical collar in place    Medications         acetaminophen  1,000 mg Oral Q8H     pantoprazole  40 mg Oral Daily     simvastatin  40 mg Oral At Bedtime     sodium chloride (PF)  3 mL Intracatheter Q8H       [facility] Neurosurgery   [address] [place] [address]  Tel [phone]  Pager [phone]

## 2023-01-25 NOTE — PROGRESS NOTES
01/25/23 1039   Appointment Info   Signing Clinician's Name / Credentials (PT) Payal Alvarado PT   Living Environment   People in Home spouse   Current Living Arrangements apartment   Home Accessibility no concerns   Transportation Anticipated family or friend will provide   Living Environment Comments pt normally drives, will be unable to do so for a few months, Family can assist and apt building also has a service that drives them to nearby grocery stores, etc   Self-Care   Usual Activity Tolerance excellent   Current Activity Tolerance moderate   Regular Exercise No   Fall history within last six months yes   Number of times patient has fallen within last six months 1   Activity/Exercise/Self-Care Comment pt normally is IND with mobility and does most of the ADLs at home, no AD at baseline   General Information   Onset of Illness/Injury or Date of Surgery 01/22/23   Referring Physician Dr Navarro   Patient/Family Therapy Goals Statement (PT) Pt hopes to go home today   Pertinent History of Current Problem (include personal factors and/or comorbidities that impact the POC) Pt admitted after falling into the wall while on his knees putting shoes away in the closet. Had pain, went to urgent care the next day and ended up at the hospital. Type II odontoid fracture and nondisplaced ernesto fracture of C1 arch. PMH includes HLD.   Existing Precautions/Restrictions   (Aspen brace at all times, no bending/lifting/twisting)   Cognition   Affect/Mental Status (Cognition) WNL   Orientation Status (Cognition) oriented x 3   Follows Commands (Cognition) WNL   Cognitive Status Comments appears cognitively intact   Pain Assessment   Patient Currently in Pain Yes, see Vital Sign flowsheet   Integumentary/Edema   Integumentary/Edema no deficits were identifed   Posture    Posture Comments pt has ASPEN collar on, forward head posture   Range of Motion (ROM)   ROM Comment ROM WFL   Strength (Manual Muscle Testing)   Strength (Manual  Muscle Testing) Deficits observed during functional mobility   Strength Comments at least partially due to pain with movement   Bed Mobility   Comment, (Bed Mobility) pt too painful to work on bed mobility with bed flat, plans to sleep in chair/recliner at home   Transfers   Comment, (Transfers) SBA sit to/from stand from bed   Gait/Stairs (Locomotion)   Distance in Feet (Gait) 200' and 100'   Comment, (Gait/Stairs) Gait 50' without AD and SBA   Balance   Balance Comments mildly decreased balance d/t pain and inability to turn head/look around, also has mild neuropathy ernesto feet.   Sensory Examination   Sensory Perception Comments neuropathy ernesto   Coordination   Coordination no deficits were identified   Muscle Tone   Muscle Tone no deficits were identified   Clinical Impression   Criteria for Skilled Therapeutic Intervention Yes, treatment indicated   PT Diagnosis (PT) impaired functional mobility   Influenced by the following impairments decreased strength, balance, pain   Functional limitations due to impairments decreased IND with mobility   Clinical Presentation (PT Evaluation Complexity) Stable/Uncomplicated   Clinical Presentation Rationale per medical record   Clinical Decision Making (Complexity) low complexity   Planned Therapy Interventions (PT) gait training;transfer training   Risk & Benefits of therapy have been explained evaluation/treatment results reviewed   PT Total Evaluation Time   PT Eval, Low Complexity Minutes (97757) 15   Physical Therapy Goals   PT Frequency One time eval and treatment only   PT Predicted Duration/Target Date for Goal Attainment 01/26/23   PT Goals Transfers;Gait   PT: Transfers Modified independent;Bed to/from chair;Sit to/from stand   PT: Gait Independent;100 feet  (no AD)   Interventions   Interventions Quick Adds Gait Training;Therapeutic Activity   Therapeutic Activity   Therapeutic Activities: dynamic activities to improve functional performance Minutes (68517) 10    Symptoms Noted During/After Treatment Fatigue;Increased pain   Treatment Detail/Skilled Intervention PT - pt with HOB elevated able to perform bed mobility INDly, some pain with movement, rates 4/10. Sit to stand from bed and toilet with SBA initially, progressing to IND. Able to don pants INDly, stood at sink to wash hands after using toilet with supervision. Pt plans to sleep in recliner at home, discussed pressure relief options (pt reports his tailbone/buttocks get sore in chair for extended periods) including getting up and walking short distances frequently, shifting weight, and pressure relief cushions. Looked some up on [employer] and reviewed some options with patient, he reports his kids will order one for him.   Gait Training   Gait Training Minutes (09029) 15   Symptoms Noted During/After Treatment (Gait Training) increased pain   Treatment Detail/Skilled Intervention PT - gt training without AD and CGA initially, progressing to SBA/mod IND at end. Pt with guarded gait pattern, encouraged him to turn shoulders to be able to look around as he can't turn head/neck. Overall moving well.   [place] Level (Gait Training) independent   PT Discharge Planning   PT Plan Home later today with home PT/OT   PT Discharge Recommendation (DC Rec) home with home care physical therapy;home with assist   PT Rationale for DC Rec Pt is normally very IND at home, doesn't use AD, just moved into  apt 6 weeks ago. There are some services there but it's brand new and it's just getting started. Pt drives and does most chores around the house as spouse has cervical dystonia and walks with a walker. Son and dtr have been staying with wife. Currently pt is moving fairly well, transfers with SBA and amb 300' without AD. Recommend return home with home PT/OT and assist from family with some chores and driving/grocery shopping, etc.   PT Brief overview of current status SBA transfers and gait 300' without AD.   Total Session Time    Timed Code Treatment Minutes 25   Total Session Time (sum of timed and untimed services) 40

## 2023-01-25 NOTE — PLAN OF CARE
Goal Outcome Evaluation:    Pt is alert and oriented x 4, VSS on RA, regular diet.  Pt needs to keep cervical collar on at all times. Pt also has another for showering placed in his room. Pain was managed with schedule tylenol and oxycodone. Pt also andrew of N/V, zofran given x 1.

## 2023-01-25 NOTE — CONSULTS
Initial Consult/  Care Management Discharge Note    Discharge Date: 01/25/2023       Discharge Disposition:  Home    Discharge Services:  Home Care-Referral sent and accepted- [facility] (PT/OT)    Discharge DME:   fitting of Aspen cervical collar and Marcia collar as ordered-Done by Orthotist 1/24    Discharge Transportation: Daughter Dari will plan to pick patient up when bedside RN Pablo calls her. ([phone])    Private pay costs discussed: Not applicable    PAS Confirmation Code:  n/a  Patient/family educated on Medicare website which has current facility and service quality ratings:  yes    Education Provided on the Discharge Plan: yes   Persons Notified of Discharge Plans: Patient, bedside RN, charge, [facility]  Patient/Family in Agreement with the Plan:  yes    Handoff Referral Completed: Yes-Outpatient Care Coordination Referral    Additional Information:   Patient admitted on 1/23/23 after falling at home.The patient was putting something away in his closet and was on his hands and knees. He leaned forward, lost balance, and fell, hitting his forehead on the wall. He presented with neck pain and inability to turn his head. Imaging in the ED showed Acute type II odontoid fracture and nondisplaced bilateral lateral fractures of the C1 arch. Patient was fitted for Marcia and [place] Collars by Orthotist on 1/24. Met with patient, introduced self and role as Care Coordinator. Writer has been informed that patient has been medically cleared to discharge home today. Patient lives with his spouse who he is the primary caregiver of. Patient has 2 adult children (Dari and Terry) who have been taking care of patient's spouse while he has been in the hospital. Writer spoke with patient and his daughter Dari. Dari and her brother Terry along with patient's 3 sister in-laws will be rotating taking care of patient and his spouse. Dari confirmed everything is  in place for patient to discharge home today. Patient and his spouse live in the independent section of an long term. ([facility] in [facility] The only service they have is dining room meals. Dari will be transporting patient home today. Bedside RN Pablo will plan to call patient's daughter Dari when discharge is ready. ([phone]). Home Care has been ordered for PT/OT. [facility] has accepted the referral for start of care 1/28. Patient and family in agreement with this plan. Patient has both of his collars for discharge. No further discharge needs.          Adriana Disla, RN  Care Coordinator  [phone]

## 2023-01-25 NOTE — PROGRESS NOTES
A/O x4. VSS on RA. PIV SL. SBA to BR/RM. Wears cervical collar at all time except during hygiene. Shower collar provided and in the RM. On regular diet. Denies N/T in all extremities. Pain managed with prn Oxy x2 and schedule Tylenol and effective.

## 2023-01-25 NOTE — PLAN OF CARE
Patient A/Ox4, VSS,RA. CMS intact, pain manage w/ oxy, Celebrex and tylenol.   Patient educated regarding pain and medication management, and follow ups.   Discharge at 1530 to home. Picked up by kortney valdivia.

## 2023-01-25 NOTE — PROGRESS NOTES
01/25/23 1330   Appointment Info   Signing Clinician's Name / Credentials (OT) Soheila Rocha, OTR/L   Living Environment   People in Home spouse   Current Living Arrangements apartment   Home Accessibility no concerns   Transportation Anticipated family or friend will provide  (apartment building also has transportation services for groceries, etc.)   Living Environment Comments Pt lives in apartment building w/ spouse. Pt has walk in shower w/ grab bars. Pt also has children who live nearby and can assist as needed   Self-Care   Usual Activity Tolerance excellent   Current Activity Tolerance moderate   Regular Exercise No   Fall history within last six months yes   Number of times patient has fallen within last six months 1   Activity/Exercise/Self-Care Comment Pt independent w/ ADL tasks at home, ambulates w/out an AD   Instrumental Activities of Daily Living (IADL)   IADL Comments Pt manages laundry, cooking, cleaning but reports family can help as needed. he does drive and manages his own meds   General Information   Onset of Illness/Injury or Date of Surgery 01/23/23   Referring Physician Jessica Navarro MD   Patient/Family Therapy Goal Statement (OT) go home   Additional Occupational Profile Info/Pertinent History of Current Problem Pt is an 81 y/o male who was admitted s/p fall with neck pain and acute type II odontoid fracture and acute nondisplaced bilateral lateral fractures of the C1 arch.   Existing Precautions/Restrictions fall;spinal  (cervical collar at all times)   Cognitive Status Examination   Orientation Status orientation to person, place and time   Affect/Mental Status (Cognitive) WNL   Visual Perception   Visual Impairment/Limitations WFL;corrective lenses full-time   Pain Assessment   Patient Currently in Pain Yes, see Vital Sign flowsheet   Range of Motion Comprehensive   Comment, General Range of Motion WFL   Strength Comprehensive (MMT)   Comment, General Manual Muscle Testing (MMT)  Assessment did not formally assess strength d/t spinal precautions, but appears WFL for ADL tasks   Transfers   Transfers toilet transfer;sit-stand transfer   Sit-Stand Transfer   Sit-Stand [place] (Transfers) modified independence   Toilet Transfer   Type (Toilet Transfer) stand-sit;sit-stand   [place] Level (Toilet Transfer) modified independence   Balance   Balance Comments pt steady while walking in baez w/out AD   Activities of Daily Living   BADL Assessment/Intervention upper body dressing;lower body dressing;toileting;grooming   Upper Body Dressing Assessment/Training   [place] Level (Upper Body Dressing) minimum assist (75% patient effort);verbal cues   Lower Body Dressing Assessment/Training   [place] Level (Lower Body Dressing) supervision;verbal cues   Grooming Assessment/Training   [place] Level (Grooming) independent   Toileting   [place] Level (Toileting) independent   Clinical Impression   Criteria for Skilled Therapeutic Interventions Met (OT) Yes, treatment indicated   OT Diagnosis decreased I/ADL independence   OT Problem List-Impairments impacting ADL problems related to;activity tolerance impaired;pain;post-surgical precautions   Assessment of Occupational Performance 3-5 Performance Deficits   Identified Performance Deficits decreased independence w/ dressing, bathing, home mgmt, driving   Planned Therapy Interventions (OT) ADL retraining;transfer training;risk factor education   Clinical Decision Making Complexity (OT) low complexity   Anticipated Equipment Needs Upon Discharge (OT)   (pt has what he needs)   Risk & Benefits of therapy have been explained evaluation/treatment results reviewed;care plan/treatment goals reviewed;risks/benefits reviewed;current/potential barriers reviewed;participants voiced agreement with care plan;participants included;patient   OT Total Evaluation Time   OT Eval, Low Complexity Minutes (67124) 10   OT Goals   Therapy Frequency (OT)  One time eval and treatment   OT Predicted Duration/Target Date for Goal Attainment 01/25/23   OT Goals Upper Body Dressing;Lower Body Dressing;Toilet Transfer/Toileting;Transfers   OT: Upper Body Dressing Minimal assist;within precautions;Goal Met   OT: Lower Body Dressing Modified independent;within precautions;Goal Met   OT: Transfer Modified independent;within precautions;Goal Met   OT: Toilet Transfer/Toileting Modified independent;within precautions;Goal Met   Interventions   Interventions Quick Adds Self-Care/Home Management   Self-Care/Home Management   Self-Care/Home Mgmt/ADL, Compensatory, Meal Prep Minutes (68464) 15   Symptoms Noted During/After Treatment (Meal Preparation/Planning Training) increased pain   Treatment Detail/Skilled Intervention Pt in chair upon arrival, agrees to OT. Plan to d/c home later today w/ family assist. Practiced don/doff of c-collar and pt able to complete w/ SBA. He stood w/ SBA and walked into bathroom w/out AD - steady throughout. Pt SBA for toilet transfer and voided. Independent w/ clothing mgmt and timothy cares. Pt then stood at sink to wash hands independently. Pt walked back into room, SBA to don pants, socks, and shoes - VC for techniqiue w/ spinal precautions. Pt then donned sweatshirt, needing Min A from writer as well as VC. Pt does have increased pain. Discussed dressing techniques and potentially using button/zip up shirts for ease. Pt agreeable. He then stood and ambulated in hallway ~300ft w/ SBA. Pt tolerated well w/ no LOB. Returned to room, pt sat in chair. Discussed shower plan - pt has grab bars and reports family can be nearby on 1st attempt for safety. Pt left w/ all needs met, RN updated .   OT Discharge Planning   OT Plan d/c OT   OT Discharge Recommendation (DC Rec) home with assist;home with home care occupational therapy   OT Rationale for DC Rec Pt functioning below baseline, limited by pain and spinal precautions/c-collar. However, he is moving  well and has good support at home from family. Recommend pt have assist w/ dressing, bathing, heavy IADL tasks and driving. Also recommend HH OT to further progress I/ADL independence at home.   OT Brief overview of current status SBA w/ FWW, Min A UB dressing   Total Session Time   Timed Code Treatment Minutes 15   Total Session Time (sum of timed and untimed services) 25

## 2023-01-25 NOTE — DISCHARGE SUMMARY
Discharge Summary  Hospitalist    Date of Admission:  1/23/2023  Date of Discharge:  1/25/2023  Discharging Provider: Jessica Navarro MD, MD  Date of Service (when I saw the patient): 01/25/23    Discharge Diagnoses   Acute type II odontoid fracture   Acute nondisplaced bilateral lateral fractures of the C1 arch   Mechanical fall     History of Present Illness   Please refer H & P for details.      Hospital Course   Jaden Zimmer is a 82 year old male with past medical history significant for HLD admitted on 1/23/2023 with neck pain.      Acute type II odontoid fracture   Acute nondisplaced bilateral lateral fractures of the C1 arch   Mechanical fall   The patient presents to the ED with neck pain. He states that he was on his knees putting something away in the closet the previous night and lost his balance when he leaned forward and fell and hit his head on the wall.  On day of admission, he has been having significant neck pain and is unable to turn his head.   * CT showed Acute type II odontoid fracture with fracture fragment alignment maintained. Acute nondisplaced bilateral lateral fractures of the C1 arch. There is mild prevertebral soft tissue edema at the C1-2 level but predental space is maintained. No significant canal compromise or neural foraminal narrowing throughout cervical spine.  - Neurosurgery consulted.  No surgical plans.  Recommended [place] cervical collar but should be worn at all times, okay to remove briefly for hygiene, shower collar will be provided for bathing.  -Will need to follow-up with neurosurgery clinic in 6 weeks with x-rays AP and lateral and at 12 weeks with repeat cervical CT scan without contrast.  Neurosurgery has signed off.  - Pain control as needed: Flexeril, Tylenol, oxycodone  -Activity as tolerated.  Avoid excessive bending and twisting of his neck and do not lift heavy objects.  - PT/OT: Cleared for discharge home with home therapies.     Hyperlipidemia  - Resume  PTA simvastatin     Patient is discharged home in stable condition on 1/25/2023.    Jessica Navarro MD, MD      Pending Results   These results will be followed up by Hospitalist team.  Unresulted Labs Ordered in the Past 30 Days of this Admission     No orders found from 12/24/2022 to 1/24/2023.          Code Status   Full Code       Primary Care Physician   ANTONELLA PAVON    Follow-ups Needed After Discharge   Follow-up Appointments     Follow-up and recommended labs and tests       Follow up with primary care provider, ANTONELLA PAVON, within 7 days   for hospital follow- up.  No follow up labs or test are needed.  Follow-up with neurosurgery clinic in 6 weeks with x-rays AP and lateral   and at 12 weeks with repeat cervical CT scan without contrast.             Physical Exam   Temp: 97.5  F (36.4  C) Temp src: Oral BP: 135/81 Pulse: 74   Resp: 18 SpO2: 98 % O2 Device: None (Room air)    Vitals:    01/23/23 1706   Weight: 71.7 kg (158 lb)     Vital Signs with Ranges  Temp:  [97.5  F (36.4  C)] 97.5  F (36.4  C)  Pulse:  [69-74] 74  Resp:  [16-18] 18  BP: (135-140)/(79-81) 135/81  SpO2:  [98 %] 98 %  I/O last 3 completed shifts:  In: 800 [P.O.:800]  Out: -     Constitutional: Alert, appears comfortable, in no acute distress, wearing [place] collar  Respiratory: Non labored breathing, clear to auscultation bilaterally, no crackles or wheezes  Cardiovascular: Heart sounds regular rate and rhythm, no murmurs, no leg edema  GI: Abdomen is soft, non distended, non tender. Normal BS  Skin/Integumen: no rashes, no pressure sores  Neuro: alert, converses appropriately, moving all extremities, fluent speech, no facial asymmetry  Psych: mood and affect appropriate             Discharge Disposition   Discharged to home  Condition at discharge: Stable    Consultations This Hospital Stay   NEUROSURGERY IP CONSULT  ORTHOSIS BRACE IP CONSULT  ORTHOSIS BRACE IP CONSULT  PHYSICAL THERAPY ADULT IP CONSULT  OCCUPATIONAL THERAPY  ADULT IP CONSULT  CARE MANAGEMENT / SOCIAL WORK IP CONSULT    Time Spent on this Encounter   I, Jessica Navarro MD, personally saw the patient today and spent greater than 30 minutes discharging this patient.    Discharge Orders      Home Care Referral      Reason for your hospital stay    C spine fracture managed non operatively     Follow-up and recommended labs and tests     Follow up with primary care provider, ANTONELLA PAVON, within 7 days for hospital follow- up.  No follow up labs or test are needed.  Follow-up with neurosurgery clinic in 6 weeks with x-rays AP and lateral and at 12 weeks with repeat cervical CT scan without contrast.     Activity    Your activity upon discharge: activity as tolerated  Avoid excessive bending and twisting of his neck and do not lift heavy objects.  Recommended [place] cervical collar that should be worn at all times, okay to remove briefly for hygiene, shower collar will be provided for bathing.     Diet    Follow this diet upon discharge: Orders Placed This Encounter      Regular Diet Adult     Discharge Medications   Current Discharge Medication List      START taking these medications    Details   acetaminophen (TYLENOL) 500 MG tablet Take 2 tabs 3 times daily for 10 days , then you can take 1 - 2 tabs every 8 hours as needed.  Qty: 100 tablet, Refills: 0    Associated Diagnoses: Closed nondisplaced fracture of second cervical vertebra, unspecified fracture morphology, initial encounter (H)      methocarbamol (ROBAXIN) 500 MG tablet Take 1 tablet (500 mg) by mouth 4 times daily as needed for muscle spasms  Qty: 30 tablet, Refills: 0    Associated Diagnoses: Closed nondisplaced fracture of second cervical vertebra, unspecified fracture morphology, initial encounter (H)      oxyCODONE (ROXICODONE) 5 MG tablet Take 1-2 tablets (5-10 mg) by mouth every 6 hours as needed for moderate to severe pain (IF pain not managed with non-pharmacological and non-opioid  interventions)  Qty: 45 tablet, Refills: 0    Associated Diagnoses: Closed nondisplaced fracture of second cervical vertebra, unspecified fracture morphology, initial encounter (H)      polyethylene glycol (MIRALAX) 17 g packet Take 17 g by mouth daily as needed for constipation  Qty: 10 packet, Refills: 0    Associated Diagnoses: Closed nondisplaced fracture of second cervical vertebra, unspecified fracture morphology, initial encounter (H)      senna-docusate (SENOKOT-S/PERICOLACE) 8.6-50 MG tablet Take 1 tablet by mouth 2 times daily Hold for loose stools  Qty: 30 tablet, Refills: 0    Associated Diagnoses: Closed nondisplaced fracture of second cervical vertebra, unspecified fracture morphology, initial encounter (H)         CONTINUE these medications which have NOT CHANGED    Details   multivitamin, therapeutic (THERA-VIT) TABS tablet Take 1 tablet by mouth daily      naproxen (NAPROSYN) 500 MG tablet Take 500 mg by mouth 2 times daily as needed (pain)      omeprazole (PRILOSEC) 20 MG DR capsule Take 20 mg by mouth daily      simvastatin (ZOCOR) 40 MG tablet Take 40 mg by mouth At Bedtime           Allergies   No Known Allergies  Data   Most Recent 3 CBC's:  Recent Labs   Lab Test 01/23/23  1851   WBC 8.5   HGB 14.3   MCV 83         Most Recent 3 BMP's:  Recent Labs   Lab Test 01/23/23 1851      POTASSIUM 3.7   CHLORIDE 102   CO2 27   BUN 29.9*   CR 0.86   ANIONGAP 15   NARENDRA 9.1   *     Most Recent 2 LFT's:  Recent Labs   Lab Test 01/23/23 1851   AST 31   ALT 28   ALKPHOS 51   BILITOTAL 0.4     Most Recent INR's and Anticoagulation Dosing History:  Anticoagulation Dose History    There is no flowsheet data to display.       Most Recent 3 Troponin's:No lab results found.  Most Recent Cholesterol Panel:No lab results found.  Most Recent 6 Bacteria Isolates From Any Culture (See EPIC Reports for Culture Details):No lab results found.  Most Recent TSH, T4 and A1c Labs:No lab results  found.    Results for orders placed or performed during the hospital encounter of 01/23/23   Cervical spine CT w/o contrast    Narrative    EXAM: CT CERVICAL SPINE W/O CONTRAST  LOCATION: [facility]  DATE/TIME: 1/23/2023 6:14 PM    INDICATION: Neck pain after a fall.  COMPARISON: None.  TECHNIQUE: CT cervical spine without contrast. Dose reduction techniques were performed.    FINDINGS: There is mild prevertebral soft tissue swelling at the level of the C1 and C2 vertebral bodies. The predental space is maintained. There is a nondisplaced acute type II odontoid fracture with no significant fracture displacement or angulation.   There are bilateral nondisplaced fractures of the lateral portions of the C1 arch bilaterally. This is best visualized on axial bone window image #32. 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 No other acute cervical spine fracture is   visualized. There is a slight anterior listhesis of C4 in relation to C5. The vertebral body heights are well-maintained throughout. There is diffuse degenerative disc disease throughout the cervical spine with a mild loss of height, endplate changes   along with small anterior and posterior osteophyte formations. There is probable ankylosis across the C5-6 disc space level. There is diffuse facet arthropathy visualized.    There is no significant canal compromise or significant neural foraminal narrowing throughout the cervical spine. The lung apices are clear. The paraspinal soft tissues are unremarkable.      Impression    IMPRESSION:  1. Acute type II odontoid fracture with fracture fragment alignment maintained.  2. Acute nondisplaced bilateral lateral fractures of the C1 arch.  3. Mild prevertebral soft tissue edema at the C1-2 level but predental space is maintained.  4.  No significant canal compromise or neural foraminal narrowing throughout cervical spine.    These findings were communicated by phone to   Joing at 6:35 pm on 1/23/2023.   XR Cervical Spine 2/3 Views    Narrative    CERVICAL SPINE TWO TO THREE VIEWS  1/24/2023 1:58 PM    INDICATION: C2 and C1 fracture.    COMPARISON: Cervical spine CT 1/23/2023.      Impression    IMPRESSION: Fractures through the base of the odontoid process and  posterior C1 ring are visible radiographically, grossly unchanged from  the prior CT allowing for differences in modality. Alignment within  normal limits, unchanged. Mild degenerative changes.    ZOE NANCE MD         SYSTEM ID:  [id number]

## 2023-01-25 NOTE — PLAN OF CARE
Occupational Therapy Discharge Summary    Reason for therapy discharge:    All goals and outcomes met, no further needs identified.    Progress towards therapy goal(s). See goals on [facility] Plan in [facility] record for goal details.  Goals met    Therapy recommendation(s):    Continued therapy is recommended.  Rationale/Recommendations:  Pt functioning below baseline, limited by pain and spinal precautions/c-collar. However, he is moving well and has good support at home from family. Recommend pt have assist w/ dressing, bathing, heavy IADL tasks and driving. Also recommend  OT to further progress I/ADL independence at home.

## 2023-01-26 ENCOUNTER — PATIENT OUTREACH (OUTPATIENT)
Dept: CARE COORDINATION | Facility: CLINIC | Age: 83
End: 2023-01-26
Payer: COMMERCIAL

## 2023-01-26 NOTE — PROGRESS NOTES
Clinic Care Coordination Contact  [facility]: Post-Discharge Note  SITUATION                                                      Admission:    Admission Date: 01/23/23   Reason for Admission: Closed nondisplaced fracture of first cervical vertebra, unspecified fracture morphology, initial encounter (H)  Discharge:   Discharge Date: 01/25/23  Discharge Diagnosis: Acute type II odontoid fracture   Acute nondisplaced bilateral lateral fractures of the C1 arch   Mechanical fall    BACKGROUND                                                      Per hospital discharge summary and inpatient provider notes:    Jaden Zimmer is a 82 year old male with a history of hyperlipidemia who presents with neck pain. The patient was putting something away in his closet last night, and was on his hands and knees. He leaned forward, lost balance, and fell, hitting his forehead on the wall. He is having neck pain today and is unable to turn his head.     ASSESSMENT           Discharge Assessment  How are you doing now that you are home?: Patient states he is doing terrible.  Still can't lay down and sleep. Having trouble sleeping.  Pain is not going away. Denies any worsening symptoms. Pain is the same as when he was in the hospital. Has not tried the oxycodone.  How are your symptoms? (Red Flag symptoms escalate to triage hotline per guidelines): Unchanged  Do you feel your condition is stable enough to be safe at home until your provider visit?: Yes  Does the patient have their discharge instructions? : Yes  Does the patient have questions regarding their discharge instructions? : No  Were you started on any new medications or were there changes to any of your previous medications? : Yes  Does the patient have all of their medications?: Yes  Do you have questions regarding any of your medications? : No  Do you have all of your needed medical supplies or equipment (DME)?  (i.e. oxygen tank, CPAP, cane, etc.): Yes  Discharge  follow-up appointment scheduled within 14 calendar days? : No         Post-op (Clinicians Only)  Did the patient have surgery or a procedure: No    Patient given 24/7 nurse line number. Patient has not tried the oxycodone.      Wife states he is not eating or drinking. Jaden states he is urinating. Jaden states he has not had anything to eat today but had some soup last night. States he does not have nausea or vomiting. Has no appetite. Wife states the no appetite has been going on for awhile. Patient did give verbal permission to speak to Pat, his wife. Patient just took some oxycodone. Wife is going to get some soup and Jello for him. RN advised patient of importance of eating and drinking fluids for his recovery.  Patient stated understanding and will try to eat the Jello and soup.  Patient and wife were advised to call nurse line if unable to keep food and fluids down. RN again stressed importance of eating and drinking for his recovery.      PLAN                                                      Outpatient Plan:  Follow up with primary care provider, ANTONELLA PAVON, within 7 days   for hospital follow- up.  No follow up labs or test are needed.  Follow-up with neurosurgery clinic in 6 weeks with x-rays AP and lateral   and at 12 weeks with repeat cervical CT scan without contrast.      No future appointments.      For any urgent concerns, please contact our 24 hour nurse triage line: [phone] ([phone])         Latesha Rondon RN

## 2023-01-27 ENCOUNTER — TELEPHONE (OUTPATIENT)
Dept: NEUROSURGERY | Facility: CLINIC | Age: 83
End: 2023-01-27
Payer: COMMERCIAL

## 2023-01-27 NOTE — TELEPHONE ENCOUNTER
Called patient spouse to confirm that all follow up visits have been scheduled with imaging.  Patient wanted spouse to call clinic back to verify scheduled dates.

## 2023-02-12 ENCOUNTER — HEALTH MAINTENANCE LETTER (OUTPATIENT)
Age: 83
End: 2023-02-12

## 2023-03-07 ENCOUNTER — HOSPITAL ENCOUNTER (OUTPATIENT)
Dept: RADIOLOGY | Facility: HOSPITAL | Age: 83
Discharge: HOME | End: 2023-03-07
Attending: NURSE PRACTITIONER | Admitting: NURSE PRACTITIONER
Payer: COMMERCIAL

## 2023-03-07 ENCOUNTER — OFFICE VISIT (OUTPATIENT)
Dept: NEUROSURGERY | Facility: CLINIC | Age: 83
End: 2023-03-07
Payer: COMMERCIAL

## 2023-03-07 VITALS
WEIGHT: 158 LBS | OXYGEN SATURATION: 98 % | BODY MASS INDEX: 23.95 KG/M2 | SYSTOLIC BLOOD PRESSURE: 118 MMHG | HEART RATE: 78 BPM | DIASTOLIC BLOOD PRESSURE: 71 MMHG | HEIGHT: 68 IN

## 2023-03-07 DIAGNOSIS — S12.101A CLOSED NONDISPLACED FRACTURE OF SECOND CERVICAL VERTEBRA, UNSPECIFIED FRACTURE MORPHOLOGY, INITIAL ENCOUNTER (H): Primary | ICD-10-CM

## 2023-03-07 DIAGNOSIS — S12.001A CLOSED NONDISPLACED FRACTURE OF FIRST CERVICAL VERTEBRA, UNSPECIFIED FRACTURE MORPHOLOGY, INITIAL ENCOUNTER (H): ICD-10-CM

## 2023-03-07 DIAGNOSIS — S12.101A CLOSED NONDISPLACED FRACTURE OF SECOND CERVICAL VERTEBRA, UNSPECIFIED FRACTURE MORPHOLOGY, INITIAL ENCOUNTER (H): ICD-10-CM

## 2023-03-07 PROCEDURE — 72040 X-RAY EXAM NECK SPINE 2-3 VW: CPT

## 2023-03-07 PROCEDURE — 99213 OFFICE O/P EST LOW 20 MIN: CPT | Performed by: NURSE PRACTITIONER

## 2023-03-07 NOTE — LETTER
"    3/7/2023         RE: Jaden Zimmer  [address]  [place] [address]        Dear Colleague,    Thank you for referring your patient, Jaden Zimmer, to the [facility] SPINE AND NEUROSURGERY. Please see a copy of my visit note below.    Neurosurgery Progress Note  03/10/23    A/P: Jaden Zimmer is a 82 year old male who is s/p hospital consult on 1/23/2023 for fall, head trauma resulting in odontoid and C1 arch fractures, currently in aspen collar which he is tolerating okay. Jaden hopes to get out of the collar today, we discussed plan for ongoing collar and CT scan at 12 weeks from injury. He does do some small micro movements in the collar, we discussed goal for complete mobilization.     XR reviewed today and unchanged, continue collar and restrictions until next appointment which is already scheduled in April.   Patient previously staffed with Dr. Cruz.     Plan:  1. Continue aspen and restrictions, I offered [place] J but Jaden would like to stay with his aspen as he is used to it   2. Follow up CT at 12 weeks to eval for interval healing  3. I encouraged him to avoid movement in the collar as much as possible to promote healing.   4. Return sooner with new or worsening symptoms.     S: Really difficult to sleep with the collar which is frustrating for him. He denies neck pain.     PMH: No interval change  PSH: No interval change    ROS:. A full 14 point review of systems was otherwise completed and is negative aside from that mentioned above in the HPI    O:  /71   Pulse 78   Ht 1.727 m (5' 8\")   Wt 71.7 kg (158 lb)   SpO2 98%   BMI 24.02 kg/m    General: Awake, alert, NAD  HEENT: AT/NC, EOMI, face symmetric, oral mucosa moist and pink  Heart: RRR: Nonlabored respirations without accessory muscle use.  Abd: S, NT, ND  Neuro: Awake, alert, speech is clear and content is appropriate. MAEW x 4 with full strength in b/l UE and LE. Sensation is intact totemperature and LT. "   Coordination: intact  Musculoskeletal: Negative straight leg raise bl  Psych: Appropriatemood and affect, pleasant, cooperative, alert and oriented x 3  Skin: no rashes, lesions   Labs: personally reviewed   Lab Results   Component Value Date     01/23/2023    CO2 27 01/23/2023    BUN 29.9 01/23/2023     Recent Labs   Lab Test 01/23/23  1851   WBC 8.5   HGB 14.3   HCT 44.0   MCV 83        No results for input(s): INR, PTT in the last 76763 hours.    Invalid input(s): FIBRINOGEN      I personally visualized all imaging.    XR 3/7/2023                                                                   IMPRESSION: Fracture through the base of the odontoid process remains visible unchanged in displacement/alignment. Fracture through the posterior C1 ring remains visible but is partially obscured which may be positional or indicate partial interval   healing. No prevertebral soft tissue swelling. Cervical spine alignment within normal limits, unchanged. Multilevel degenerative changes.    Lena Bhakta, CNP  [facility] Neurosurgery  O: [phone]            Again, thank you for allowing me to participate in the care of your patient.        Sincerely,        Lena Bhakta NP

## 2023-03-10 NOTE — PATIENT INSTRUCTIONS
X-ray was unchanged, no further displacement visible of fracture. Keep next planned follow up with CT scan in April.   Continue collar at all times and activity restrictions. Do your best to minimize micro-movements in the collar to promote healing.   Call with questions/concerns/new symptoms.

## 2023-03-10 NOTE — PROGRESS NOTES
"Neurosurgery Progress Note  03/10/23    A/P: Jaden Zimmer is a 82 year old male who is s/p hospital consult on 1/23/2023 for fall, head trauma resulting in odontoid and C1 arch fractures, currently in aspen collar which he is tolerating okay. Jaden hopes to get out of the collar today, we discussed plan for ongoing collar and CT scan at 12 weeks from injury. He does do some small micro movements in the collar, we discussed goal for complete mobilization.     XR reviewed today and unchanged, continue collar and restrictions until next appointment which is already scheduled in April.   Patient previously staffed with Dr. Cruz.     Plan:  1. Continue aspen and restrictions, I offered [place] J but Jaden would like to stay with his aspen as he is used to it   2. Follow up CT at 12 weeks to eval for interval healing  3. I encouraged him to avoid movement in the collar as much as possible to promote healing.   4. Return sooner with new or worsening symptoms.     S: Really difficult to sleep with the collar which is frustrating for him. He denies neck pain.     PMH: No interval change  PSH: No interval change    ROS:. A full 14 point review of systems was otherwise completed and is negative aside from that mentioned above in the HPI    O:  /71   Pulse 78   Ht 1.727 m (5' 8\")   Wt 71.7 kg (158 lb)   SpO2 98%   BMI 24.02 kg/m    General: Awake, alert, NAD  HEENT: AT/NC, EOMI, face symmetric, oral mucosa moist and pink  Heart: RRR: Nonlabored respirations without accessory muscle use.  Abd: S, NT, ND  Neuro: Awake, alert, speech is clear and content is appropriate. MAEW x 4 with full strength in b/l UE and LE. Sensation is intact totemperature and LT.   Coordination: intact  Musculoskeletal: Negative straight leg raise bl  Psych: Appropriatemood and affect, pleasant, cooperative, alert and oriented x 3  Skin: no rashes, lesions   Labs: personally reviewed   Lab Results   Component Value Date     01/23/2023    " CO2 27 01/23/2023    BUN 29.9 01/23/2023     Recent Labs   Lab Test 01/23/23  1851   WBC 8.5   HGB 14.3   HCT 44.0   MCV 83        No results for input(s): INR, PTT in the last 61187 hours.    Invalid input(s): FIBRINOGEN      I personally visualized all imaging.    XR 3/7/2023                                                                   IMPRESSION: Fracture through the base of the odontoid process remains visible unchanged in displacement/alignment. Fracture through the posterior C1 ring remains visible but is partially obscured which may be positional or indicate partial interval   healing. No prevertebral soft tissue swelling. Cervical spine alignment within normal limits, unchanged. Multilevel degenerative changes.    Lena Bhakta CNP  [facility] Neurosurgery  O: [phone]

## 2023-04-14 ENCOUNTER — PRE VISIT (OUTPATIENT)
Dept: NEUROSURGERY | Facility: CLINIC | Age: 83
End: 2023-04-14
Payer: COMMERCIAL

## 2023-04-14 NOTE — TELEPHONE ENCOUNTER
NEUROSURGERY- NEW PREVISIT PLANNING       Record Status/Location     Referring Provider Epic Return   Diagnosis In 3/7/23 progress note Closed nondisplaced fracture of second cervical vertebra, unspecified fracture morphology, initial encounter (H); Closed nondisplaced fracture of first cervical vertebra, unspecified fracture morphology, initial encounter (H)       MRI (HEAD, NECK, SPINE)  NO   CT Epic Cervical Scheduled 4/19/23 @ 12pm  Cervical 1/23/23   X-ray Epic Cervical 3/7/23  Cervical 1/24/23   INJECTION  NO   PHYSICAL THERAPY  NO   SURGERY  NO

## 2023-04-19 ENCOUNTER — HOSPITAL ENCOUNTER (OUTPATIENT)
Dept: CT IMAGING | Facility: HOSPITAL | Age: 83
Discharge: HOME | End: 2023-04-19
Attending: NURSE PRACTITIONER | Admitting: NURSE PRACTITIONER
Payer: COMMERCIAL

## 2023-04-19 ENCOUNTER — OFFICE VISIT (OUTPATIENT)
Dept: NEUROSURGERY | Facility: CLINIC | Age: 83
End: 2023-04-19
Payer: COMMERCIAL

## 2023-04-19 VITALS — SYSTOLIC BLOOD PRESSURE: 122 MMHG | HEART RATE: 66 BPM | DIASTOLIC BLOOD PRESSURE: 59 MMHG | OXYGEN SATURATION: 99 %

## 2023-04-19 DIAGNOSIS — S12.001A CLOSED NONDISPLACED FRACTURE OF FIRST CERVICAL VERTEBRA, UNSPECIFIED FRACTURE MORPHOLOGY, INITIAL ENCOUNTER (H): ICD-10-CM

## 2023-04-19 DIAGNOSIS — S12.101A CLOSED NONDISPLACED FRACTURE OF SECOND CERVICAL VERTEBRA, UNSPECIFIED FRACTURE MORPHOLOGY, INITIAL ENCOUNTER (H): ICD-10-CM

## 2023-04-19 DIAGNOSIS — S12.101A CLOSED NONDISPLACED FRACTURE OF SECOND CERVICAL VERTEBRA, UNSPECIFIED FRACTURE MORPHOLOGY, INITIAL ENCOUNTER (H): Primary | ICD-10-CM

## 2023-04-19 PROCEDURE — 99213 OFFICE O/P EST LOW 20 MIN: CPT | Performed by: NURSE PRACTITIONER

## 2023-04-19 PROCEDURE — 72125 CT NECK SPINE W/O DYE: CPT

## 2023-04-19 ASSESSMENT — PAIN SCALES - GENERAL: PAINLEVEL: NO PAIN (1)

## 2023-04-19 NOTE — PROGRESS NOTES
"Neurosurgery Progress Note      A/P: Jaden Zimmer is a 82 year old male who is s/p hospital consult on 1/23/2023 for fall, head trauma resulting in type II odontoid fracture and bilateral nondisplaced C1 arch fractures. Managed in an aspen collar. Dr Cruz patient    Jaden is feeling fine. Wearing collar for all \"but an hour\" per day. Denies arm or leg pain, numbness, or weakness. Here with a family member today.    CT today shows no significant healing of C1, C2 fractures. Some widening of fracture lucency of C2. No significant change in alignment otherwise.  It has been 3 mos of healing in the cervical collar without healing. Patient states he would not tolerate the collar long term whatsoever. Recommend surgical consult.     Patient previously staffed with Dr. Cruz. He would like to meet with him for consult. This was arranged prior to end of visit. Should have cervical XR prior. He asks for a new collar. List of orthotics #s given for him to contact to arrange collar fitting. Continue current collar at all times, no exceptions other than skin checks under collar twice daily. Continue activity restrictions.    Plan:  1. Continue aspen at all times and activity restrictions  2. Cervical spine xr prior to appt below  3. Referral to neurosurgeon for surgical consult (Monday 2:40pm [place] Dr Cruz)       O:  /59   Pulse 66   SpO2 99%     General: Awake, alert, NAD  Neuro: Awake, alert, speech is clear and content is appropriate.     MAEW x 4     full strength in b/l UE and LE.     Sensation is intact totemperature and LT.     Musculoskeletal: Negative straight leg raise bl    Psych: Appropriatemood and affect, pleasant, cooperative, alert and oriented x 3    Skin: no rashes, lesions           Imaging:  I personally visualized all imaging.  EXAM: CT CERVICAL SPINE W/O CONTRAST  LOCATION: [facility]  DATE/TIME: 4/19/2023 12:10 PM CDT     INDICATION: odontoid fx follow up  COMPARISON: " 03/07/2023. 01/23/2023  TECHNIQUE: Routine CT Cervical Spine without IV contrast. Multiplanar reformats. Dose reduction techniques were used.     FINDINGS:  VERTEBRA:   No interval healing changes of the C1 posterior arch fractures. No significant change in alignment of these fractures.     Type II dens fracture exhibits slightly widened fracture lucency, now measuring 2.5 mm ventrally, previously 1 mm. No significant change in fracture alignment otherwise. No interval healing changes     No new fracture or posttraumatic subluxation. Unchanged alignment. Vertebral body heights are maintained. No significant change in multilevel cervical spondylosis.     CANAL/FORAMINA: No high-grade spinal canal or neural foraminal stenosis.     PARASPINAL: No extraspinal abnormality.                                                                      IMPRESSION:  1.  Type II dens fracture exhibited slightly widened fracture lucency, most prominent ventrally. No sufficient change in fracture alignment otherwise and no interval healing changes.  2.  No interval healing changes of the C1 posterior arch fractures. No significant change in alignment.  3.  No high-grade spinal canal or neural foraminal stenosis    Jerrica Damon FNP-C  [facility] Neurosurgery  O. [phone]

## 2023-04-19 NOTE — NURSING NOTE
"Jaden Zimmer is a 82 year old male who presents for:  Chief Complaint   Patient presents with     Follow Up     CT follow up  Pain upon sudden movements - hurt during CT today, for example        Initial Vitals:  /59   Pulse 66   SpO2 99%  Estimated body mass index is 24.02 kg/m  as calculated from the following:    Height as of 3/7/23: 5' 8\" (1.727 m).    Weight as of 3/7/23: 158 lb (71.7 kg).. There is no height or weight on file to calculate BSA. BP completed using cuff size: regular  No Pain (1)      Juanjose Verduzco  " Received a call from Mahin Warren caregiver reporting that she found his pills on the floor this morning as the nurse dropped them on the floor and was wondering what to do. She reports there is 1-1/2 tablets of Warfarin on the floor, and wondered if she should recheck his INR . Writer advised her to recheck his INR and call writer back and further Warfarin dosing instructions would be provided.

## 2023-04-19 NOTE — LETTER
"    4/19/2023         RE: Jaden Zimmer  [address]  [place] [address]        Dear Colleague,    Thank you for referring your patient, Jaden Zimmer, to the [facility] SPINE AND NEUROSURGERY. Please see a copy of my visit note below.    Neurosurgery Progress Note      A/P: Jaden Zimmer is a 82 year old male who is s/p hospital consult on 1/23/2023 for fall, head trauma resulting in type II odontoid fracture and bilateral nondisplaced C1 arch fractures. Managed in an aspen collar. Dr Cruz patient    Jaden is feeling fine. Wearing collar for all \"but an hour\" per day. Denies arm or leg pain, numbness, or weakness. Here with a family member today.    CT today shows no significant healing of C1, C2 fractures. Some widening of fracture lucency of C2. No significant change in alignment otherwise.  It has been 3 mos of healing in the cervical collar without healing. Patient states he would not tolerate the collar long term whatsoever. Recommend surgical consult.     Patient previously staffed with Dr. Cruz. He would like to meet with him for consult. This was arranged prior to end of visit. Should have cervical XR prior. He asks for a new collar. List of orthotics #s given for him to contact to arrange collar fitting. Continue current collar at all times, no exceptions other than skin checks under collar twice daily. Continue activity restrictions.    Plan:  1. Continue aspen at all times and activity restrictions  2. Cervical spine xr prior to appt below  3. Referral to neurosurgeon for surgical consult (Monday 2:40pm [place] Dr Cruz)       O:  /59   Pulse 66   SpO2 99%     General: Awake, alert, NAD  Neuro: Awake, alert, speech is clear and content is appropriate.     MAEW x 4     full strength in b/l UE and LE.     Sensation is intact totemperature and LT.     Musculoskeletal: Negative straight leg raise bl    Psych: Appropriatemood and affect, pleasant, cooperative, alert and " oriented x 3    Skin: no rashes, lesions           Imaging:  I personally visualized all imaging.  EXAM: CT CERVICAL SPINE W/O CONTRAST  LOCATION: [facility]  DATE/TIME: 4/19/2023 12:10 PM CDT     INDICATION: odontoid fx follow up  COMPARISON: 03/07/2023. 01/23/2023  TECHNIQUE: Routine CT Cervical Spine without IV contrast. Multiplanar reformats. Dose reduction techniques were used.     FINDINGS:  VERTEBRA:   No interval healing changes of the C1 posterior arch fractures. No significant change in alignment of these fractures.     Type II dens fracture exhibits slightly widened fracture lucency, now measuring 2.5 mm ventrally, previously 1 mm. No significant change in fracture alignment otherwise. No interval healing changes     No new fracture or posttraumatic subluxation. Unchanged alignment. Vertebral body heights are maintained. No significant change in multilevel cervical spondylosis.     CANAL/FORAMINA: No high-grade spinal canal or neural foraminal stenosis.     PARASPINAL: No extraspinal abnormality.                                                                      IMPRESSION:  1.  Type II dens fracture exhibited slightly widened fracture lucency, most prominent ventrally. No sufficient change in fracture alignment otherwise and no interval healing changes.  2.  No interval healing changes of the C1 posterior arch fractures. No significant change in alignment.  3.  No high-grade spinal canal or neural foraminal stenosis    Jerrica Damon FNP-C  [facility] Neurosurgery  O. [phone]          Again, thank you for allowing me to participate in the care of your patient.        Sincerely,        Jerrica Damon, ALEXANDREA CNP

## 2023-04-19 NOTE — PATIENT INSTRUCTIONS
CT today shows no significant healing of C1, C2 fractures. Some widening of fracture lucency of C2. No significant change in alignment otherwise.  It has been 3 mos of healing in the cervical collar without healing. Patient states he would not tolerate the collar long term. Recommend surgical consult.       Plan:  1. Continue aspen at all times and activity restrictions  2. Cervical spine xr prior to appt below  3. Referral to neurosurgeon for surgical consult (Monday 2:40pm [place] Dr Cruz)    Jerrica WANP-C  [facility] Neurosurgery  O. [phone]

## 2023-04-20 ENCOUNTER — HOSPITAL ENCOUNTER (OUTPATIENT)
Dept: RADIOLOGY | Facility: HOSPITAL | Age: 83
Discharge: HOME | End: 2023-04-20
Attending: NURSE PRACTITIONER | Admitting: NURSE PRACTITIONER
Payer: COMMERCIAL

## 2023-04-20 DIAGNOSIS — S12.001A CLOSED NONDISPLACED FRACTURE OF FIRST CERVICAL VERTEBRA, UNSPECIFIED FRACTURE MORPHOLOGY, INITIAL ENCOUNTER (H): ICD-10-CM

## 2023-04-20 DIAGNOSIS — S12.101A CLOSED NONDISPLACED FRACTURE OF SECOND CERVICAL VERTEBRA, UNSPECIFIED FRACTURE MORPHOLOGY, INITIAL ENCOUNTER (H): ICD-10-CM

## 2023-04-20 PROCEDURE — 72050 X-RAY EXAM NECK SPINE 4/5VWS: CPT

## 2023-04-24 ENCOUNTER — OFFICE VISIT (OUTPATIENT)
Dept: NEUROSURGERY | Facility: CLINIC | Age: 83
End: 2023-04-24
Attending: NEUROLOGICAL SURGERY
Payer: COMMERCIAL

## 2023-04-24 VITALS — HEART RATE: 76 BPM | SYSTOLIC BLOOD PRESSURE: 132 MMHG | OXYGEN SATURATION: 99 % | DIASTOLIC BLOOD PRESSURE: 70 MMHG

## 2023-04-24 DIAGNOSIS — S12.112G CLOSED NONDISPLACED ODONTOID FRACTURE WITH TYPE II MORPHOLOGY AND DELAYED HEALING, SUBSEQUENT ENCOUNTER: Primary | ICD-10-CM

## 2023-04-24 PROCEDURE — 99213 OFFICE O/P EST LOW 20 MIN: CPT | Performed by: NEUROLOGICAL SURGERY

## 2023-04-24 PROCEDURE — G0463 HOSPITAL OUTPT CLINIC VISIT: HCPCS | Performed by: NEUROLOGICAL SURGERY

## 2023-04-24 ASSESSMENT — PAIN SCALES - GENERAL: PAINLEVEL: NO PAIN (0)

## 2023-04-24 NOTE — PATIENT INSTRUCTIONS
Patient Next Steps:      Order placed for cervical XR imaging to be completed prior to your next appointment    OK to begin weaning out of your cervical collar,   To wean from your cervical collar, begin by removing the collar for 1-2 hours on the first day.   You may sleep out of the collar.  Increase the time you are not wearing the collar slowly by 1-2 hours per day.  Listen to your body as you wean from the collar.  If you experience increased pain or back/neck spasms, back down on how long you are out of the collar (example: go back to how may hours you were out of the collar yesterday or do not increase your time out of the collar tomorrow).  Resume increasing your time out of the collar when ready.  When you tolerate being out of collar for eight hours, you may discontinue the collar altogether.  Everyone is different, however, you may anticipate weaning from the collar over seven to ten days.      Dr. Cruz would like to see you back in the clinic for follow up in 1 month(s) with an XR prior. Please call the number below to schedule.       Please call us if you have any further questions or concerns.    [facility]   Phone: [phone]  Fax: [phone]

## 2023-04-24 NOTE — NURSING NOTE
"Jaden Zimmer is a 82 year old male who presents for:  Chief Complaint   Patient presents with     Consult        Vitals:    Vitals:    04/24/23 1445   BP: 132/70   Pulse: 76   SpO2: 99%       BMI:  Estimated body mass index is 24.02 kg/m  as calculated from the following:    Height as of 3/7/23: 5' 8\" (1.727 m).    Weight as of 3/7/23: 158 lb (71.7 kg).    Pain Score:  No Pain (0)        Amendo Phorjose martin      "

## 2023-04-24 NOTE — LETTER
"    4/24/2023         RE: Jaden Zimmer  [address]  [place] [address]        Dear Colleague,    Thank you for referring your patient, Jaden Zimmer, to the [facility] NEUROSURGERY CLINIC [place]. Please see a copy of my visit note below.    It was a pleasure to see Jaden Zimmer today in Neurosurgery Clinic. He is a 82 year old male who was hospitalized in January after a fall and a type II odontoid fracture.    He is here today for evaluation after 3 months of wearing a cervical collar and imaging evidence of lack of healing.    He denies any neck pain or any neurologic symptoms at this point.    Vitals:    04/24/23 1445   BP: 132/70   Pulse: 76   SpO2: 99%     Estimated body mass index is 24.02 kg/m  as calculated from the following:    Height as of 3/7/23: 1.727 m (5' 8\").    Weight as of 3/7/23: 71.7 kg (158 lb).  No Pain (0)    Wearing cervical collar  Bilateral upper and lower extremity strength is 5 out of 5 in all muscle groups      Imaging: His initial CT in January shows minimal displacement of the type II odontoid fracture.  His most recent CT from April 19 shows that there does not appear to have been bony healing across the fracture line but the fracture continues to be in good anatomic alignment.  Imaging was reviewed with the patient shown the patient in clinic today.    Assessment: Type II odontoid fracture with delayed healing    Plan: We discussed treatment options including continuing in the cervical collar, trying to wean out of the cervical collar, or surgical intervention.  Neither he nor I think that continuing the cervical collar indefinitely are a good option.  However I do think that given his current lack of symptoms I think it would be reasonable to see if he can wean out of the collar followed by flexion-extension x-rays to assess for perhaps a fibrous nonunion.    We discussed the typical course of weaning of the collar and that if he has " significant worsening neck pain he should let us know otherwise we will see him in 1 month with flexion-extension x-rays.  If there is signs of instability at this time or worsening neck pain then we would potentially need to proceed with an instrumented fusion.  However I do think there is a reasonable chance that he can have a stable nonunion.         Again, thank you for allowing me to participate in the care of your patient.        Sincerely,        Juan Cruz MD

## 2023-04-24 NOTE — PROGRESS NOTES
"It was a pleasure to see Jaden Zimmer today in Neurosurgery Clinic. He is a 82 year old male who was hospitalized in January after a fall and a type II odontoid fracture.    He is here today for evaluation after 3 months of wearing a cervical collar and imaging evidence of lack of healing.    He denies any neck pain or any neurologic symptoms at this point.    Vitals:    04/24/23 1445   BP: 132/70   Pulse: 76   SpO2: 99%     Estimated body mass index is 24.02 kg/m  as calculated from the following:    Height as of 3/7/23: 1.727 m (5' 8\").    Weight as of 3/7/23: 71.7 kg (158 lb).  No Pain (0)    Wearing cervical collar  Bilateral upper and lower extremity strength is 5 out of 5 in all muscle groups      Imaging: His initial CT in January shows minimal displacement of the type II odontoid fracture.  His most recent CT from April 19 shows that there does not appear to have been bony healing across the fracture line but the fracture continues to be in good anatomic alignment.  Imaging was reviewed with the patient shown the patient in clinic today.    Assessment: Type II odontoid fracture with delayed healing    Plan: We discussed treatment options including continuing in the cervical collar, trying to wean out of the cervical collar, or surgical intervention.  Neither he nor I think that continuing the cervical collar indefinitely are a good option.  However I do think that given his current lack of symptoms I think it would be reasonable to see if he can wean out of the collar followed by flexion-extension x-rays to assess for perhaps a fibrous nonunion.    We discussed the typical course of weaning of the collar and that if he has significant worsening neck pain he should let us know otherwise we will see him in 1 month with flexion-extension x-rays.  If there is signs of instability at this time or worsening neck pain then we would potentially need to proceed with an instrumented fusion.  However I do think there " is a reasonable chance that he can have a stable nonunion.

## 2023-05-26 ENCOUNTER — ANCILLARY PROCEDURE (OUTPATIENT)
Dept: GENERAL RADIOLOGY | Facility: CLINIC | Age: 83
End: 2023-05-26
Attending: NEUROLOGICAL SURGERY
Payer: COMMERCIAL

## 2023-05-26 ENCOUNTER — OFFICE VISIT (OUTPATIENT)
Dept: NEUROSURGERY | Facility: CLINIC | Age: 83
End: 2023-05-26
Payer: COMMERCIAL

## 2023-05-26 VITALS — OXYGEN SATURATION: 100 % | DIASTOLIC BLOOD PRESSURE: 69 MMHG | SYSTOLIC BLOOD PRESSURE: 120 MMHG | HEART RATE: 69 BPM

## 2023-05-26 DIAGNOSIS — S12.112G CLOSED NONDISPLACED ODONTOID FRACTURE WITH TYPE II MORPHOLOGY AND DELAYED HEALING, SUBSEQUENT ENCOUNTER: ICD-10-CM

## 2023-05-26 DIAGNOSIS — S12.112G CLOSED NONDISPLACED ODONTOID FRACTURE WITH TYPE II MORPHOLOGY AND DELAYED HEALING, SUBSEQUENT ENCOUNTER: Primary | ICD-10-CM

## 2023-05-26 PROCEDURE — 72040 X-RAY EXAM NECK SPINE 2-3 VW: CPT

## 2023-05-26 PROCEDURE — 99213 OFFICE O/P EST LOW 20 MIN: CPT | Performed by: NEUROLOGICAL SURGERY

## 2023-05-26 ASSESSMENT — PAIN SCALES - GENERAL: PAINLEVEL: NO PAIN (1)

## 2023-05-26 NOTE — LETTER
"    5/26/2023         RE: Jaden Zimmer  [address]  [place] [address]        Dear Colleague,    Thank you for referring your patient, Jaden Zimmer, to the [facility] NEUROLOGY CLINICS [facility]. Please see a copy of my visit note below.    It was a pleasure to see Jaden Zimmer today in Neurosurgery Clinic. He is a 82 year old male who is here for follow-up of his type II odontoid fracture.    He has weaned out of the collar and is overall doing reasonably well.  His main symptoms are around the left supraorbital region where he has a rash and itchiness.  He feels that his neck has been doing better since he has been out of the collar.    Vitals:    05/26/23 1415   BP: 120/69   Pulse: 69   SpO2: 100%     Estimated body mass index is 24.02 kg/m  as calculated from the following:    Height as of 3/7/23: 1.727 m (5' 8\").    Weight as of 3/7/23: 71.7 kg (158 lb).  No Pain (1)    Awake alert and oriented    Slightly indurated red area in the left supraorbital region.  Neurologically intact.    Imaging: Review of his flexion-extension x-rays does not show any evidence of instability across the fracture.  The imaging was reviewed with patient showed the patient clinic today.    Assessment: Stable nonunion of odontoid fracture    Plan: Overall I think he is doing well.  I do not think that surgical intervention is likely to be necessary.  We discussed that if his symptoms worsen or he has worsening problems in the neck that he should return to see me to discuss potential surgical intervention.  I have encouraged him to discuss his supraorbital rash and redness with his primary care provider as I think this could represent shingles or other treatable problem.         Again, thank you for allowing me to participate in the care of your patient.        Sincerely,        Juan Cruz MD    "

## 2023-05-26 NOTE — NURSING NOTE
"Jaden Zimmer is a 82 year old male who presents for:  Chief Complaint   Patient presents with     RECHECK        Initial Vitals:  /69   Pulse 69   SpO2 100%  Estimated body mass index is 24.02 kg/m  as calculated from the following:    Height as of 3/7/23: 5' 8\" (1.727 m).    Weight as of 3/7/23: 158 lb (71.7 kg).. There is no height or weight on file to calculate BSA. BP completed using cuff size: regular  No Pain (1)    Og Harvey    "

## 2023-05-26 NOTE — PROGRESS NOTES
"It was a pleasure to see Jaden Zimmer today in [facility]. He is a 82 year old male who is here for follow-up of his type II odontoid fracture.    He has weaned out of the collar and is overall doing reasonably well.  His main symptoms are around the left supraorbital region where he has a rash and itchiness.  He feels that his neck has been doing better since he has been out of the collar.    Vitals:    05/26/23 1415   BP: 120/69   Pulse: 69   SpO2: 100%     Estimated body mass index is 24.02 kg/m  as calculated from the following:    Height as of 3/7/23: 1.727 m (5' 8\").    Weight as of 3/7/23: 71.7 kg (158 lb).  No Pain (1)    Awake alert and oriented    Slightly indurated red area in the left supraorbital region.  Neurologically intact.    Imaging: Review of his flexion-extension x-rays does not show any evidence of instability across the fracture.  The imaging was reviewed with patient showed the patient clinic today.    Assessment: Stable nonunion of odontoid fracture    Plan: Overall I think he is doing well.  I do not think that surgical intervention is likely to be necessary.  We discussed that if his symptoms worsen or he has worsening problems in the neck that he should return to see me to discuss potential surgical intervention.  I have encouraged him to discuss his supraorbital rash and redness with his primary care provider as I think this could represent shingles or other treatable problem.     "

## 2024-03-10 ENCOUNTER — HEALTH MAINTENANCE LETTER (OUTPATIENT)
Age: 84
End: 2024-03-10

## 2025-03-16 ENCOUNTER — HEALTH MAINTENANCE LETTER (OUTPATIENT)
Age: 85
End: 2025-03-16